# Patient Record
Sex: FEMALE | Race: WHITE | Employment: STUDENT | URBAN - METROPOLITAN AREA
[De-identification: names, ages, dates, MRNs, and addresses within clinical notes are randomized per-mention and may not be internally consistent; named-entity substitution may affect disease eponyms.]

---

## 2024-06-09 DIAGNOSIS — F39 MOOD DISORDER (HCC): ICD-10-CM

## 2024-06-10 RX ORDER — ARIPIPRAZOLE 10 MG/1
10 TABLET ORAL DAILY
Qty: 30 TABLET | Refills: 4 | OUTPATIENT
Start: 2024-06-10 | End: 2024-07-10

## 2024-08-12 ENCOUNTER — OFFICE VISIT (OUTPATIENT)
Dept: ORTHOPEDIC SURGERY | Age: 24
End: 2024-08-12

## 2024-08-12 VITALS — HEIGHT: 61 IN | BODY MASS INDEX: 23.6 KG/M2 | WEIGHT: 125 LBS

## 2024-08-12 DIAGNOSIS — M25.561 RIGHT KNEE PAIN, UNSPECIFIED CHRONICITY: Primary | ICD-10-CM

## 2024-08-12 RX ORDER — DICLOFENAC SODIUM 75 MG/1
75 TABLET, DELAYED RELEASE ORAL 2 TIMES DAILY
Qty: 60 TABLET | Refills: 0 | Status: SHIPPED | OUTPATIENT
Start: 2024-08-12

## 2024-08-12 NOTE — PROGRESS NOTES
Name: Eri Valero  YOB: 2000  Gender: female  MRN: [id number]    CC: Knee Pain (R)     HPI: Eri Valero is a 24 y.o. female who presents with Knee Pain (R)  She describes an injury that occurred approximately 4 weeks ago while working in a .  She stepped on a Lego block and felt a pivot and pop in her knee.  Since that time she has been using an knee brace for ambulation and using over-the-counter Tylenol and ibuprofen for pain.  She states ibuprofen and Tylenol did not help with her pain.  She has also been utilizing crutches but did not bring them in with her today.  She has not been able to go to work due to this knee pain and she is not able to go back until she can lift 50 pounds without pain.  She is not able to straighten his knee out at this time. She presents today with x-rays for further evaluation.     ROS/Meds/PSH/PMH/FH/SH: I personally reviewed the patients standard intake form.  Below are the pertinents    Tobacco:  reports that she has been smoking e-cigarettes. She uses smokeless tobacco.  Diabetes: none  Other: none    Physical Examination:  General: no acute distress  Lungs: breathing easily  CV: regular rhythm by pulse  Right Knee:     No significant joint effusion noted today.  Negative apprehension test.  I am not able to straighten out her leg today due to the pain and guarding.  Equivocal Lachman's. Equivocal anterior and posterior drawer.  Stable to varus stress I have a hard time evaluating very stressed today due to pain and guarding.  Calf is soft.  Sensation intact distally.  Dorsi and plantarflexion looks intact.  Dorsalis pedis pulse 2+.  Palpate along the hamstring tendons.  Tender to palpate along the medial joint line.    Imaging:     X-rays reviewed on PACS today but they were pushed from an outside source.  AP, oblique, lateral views of right knee reviewed today.  No acute findings noted.  Joint space well-maintained.  Normal right knee

## 2024-08-14 ENCOUNTER — TELEMEDICINE (OUTPATIENT)
Dept: BEHAVIORAL/MENTAL HEALTH CLINIC | Age: 24
End: 2024-08-14
Payer: COMMERCIAL

## 2024-08-14 DIAGNOSIS — F39 MOOD DISORDER (HCC): Primary | ICD-10-CM

## 2024-08-14 DIAGNOSIS — F51.04 PSYCHOPHYSIOLOGICAL INSOMNIA: ICD-10-CM

## 2024-08-14 PROCEDURE — 99215 OFFICE O/P EST HI 40 MIN: CPT | Performed by: NURSE PRACTITIONER

## 2024-08-14 RX ORDER — ARIPIPRAZOLE 15 MG/1
15 TABLET ORAL EVERY MORNING
Qty: 30 TABLET | Refills: 4 | Status: SHIPPED | OUTPATIENT
Start: 2024-08-14 | End: 2025-01-11

## 2024-08-14 RX ORDER — TRAZODONE HYDROCHLORIDE 100 MG/1
100 TABLET ORAL NIGHTLY PRN
Qty: 30 TABLET | Refills: 3 | Status: SHIPPED | OUTPATIENT
Start: 2024-08-14 | End: 2024-12-12

## 2024-08-14 ASSESSMENT — PATIENT HEALTH QUESTIONNAIRE - PHQ9
3. TROUBLE FALLING OR STAYING ASLEEP: NOT AT ALL
SUM OF ALL RESPONSES TO PHQ QUESTIONS 1-9: 0
SUM OF ALL RESPONSES TO PHQ9 QUESTIONS 1 & 2: 0
9. THOUGHTS THAT YOU WOULD BE BETTER OFF DEAD, OR OF HURTING YOURSELF: NOT AT ALL
SUM OF ALL RESPONSES TO PHQ QUESTIONS 1-9: 0
7. TROUBLE CONCENTRATING ON THINGS, SUCH AS READING THE NEWSPAPER OR WATCHING TELEVISION: NOT AT ALL
4. FEELING TIRED OR HAVING LITTLE ENERGY: NOT AT ALL
2. FEELING DOWN, DEPRESSED OR HOPELESS: NOT AT ALL
5. POOR APPETITE OR OVEREATING: NOT AT ALL
SUM OF ALL RESPONSES TO PHQ QUESTIONS 1-9: 0
1. LITTLE INTEREST OR PLEASURE IN DOING THINGS: NOT AT ALL
SUM OF ALL RESPONSES TO PHQ QUESTIONS 1-9: 0
6. FEELING BAD ABOUT YOURSELF - OR THAT YOU ARE A FAILURE OR HAVE LET YOURSELF OR YOUR FAMILY DOWN: NOT AT ALL
10. IF YOU CHECKED OFF ANY PROBLEMS, HOW DIFFICULT HAVE THESE PROBLEMS MADE IT FOR YOU TO DO YOUR WORK, TAKE CARE OF THINGS AT HOME, OR GET ALONG WITH OTHER PEOPLE: NOT DIFFICULT AT ALL

## 2024-08-14 NOTE — PROGRESS NOTES
OUTPATIENT PSYCHIATRIC RETURN VISIT PROGRESS NOTE  Eri Valero, was evaluated through a synchronous (real-time) audio-video encounter. The patient (or guardian if applicable) is aware that this is a billable service, which includes applicable co-pays. This Virtual Visit was conducted with patient's (and/or legal guardian's) consent. Patient identification was verified, and a caregiver was present when appropriate.   The patient was located at Home: [address] [facility] [address]  Provider was located at Facility (Appt Dept): [address] [facility]  [address]  [place] [address]  Confirm you are appropriately licensed, registered, or certified to deliver care in the state where the patient is located as indicated above. If you are not or unsure, please re-schedule the visit: Yes, I confirm.        45  minutes total time spent for this encounter: face to face with the patient with more than 50% of the total time spent on education, counseling, & coordination of care of the patient regarding ongoing psychiatric illness.    --Suzanne Bermudez, UVALDO - CNP on 8/14/2024 at 1:26 PM    An electronic signature was used to authenticate this note.   Date of Service: 8/14/2024     Identification    Eri Valero is a 24 y.o.  with a past psychiatric history of mood d/o, insomnia  who presents today for a psychiatric follow up appointment.      CC:  Routine medication management follow up.    Chief Complaint   Patient presents with    Follow-up     2 month f/u, has had fluctuating emotions for about a month and being irritable, wants to discuss med modification        Subjective / Interval History:    Pt visit via My Chart  today and reports that Abilify was effective initially but effectiveness has waned the past 3 weeks and she has been more irritable. Agrees to increase Abilify to 15 mg Q AM. Trazodone 100 mg effective for insomnia. . Pt has been medication compliant and denies any side-effects. Pt denies

## 2024-08-28 ENCOUNTER — TELEPHONE (OUTPATIENT)
Dept: ORTHOPEDIC SURGERY | Age: 24
End: 2024-08-28

## 2024-08-28 DIAGNOSIS — M25.561 RIGHT KNEE PAIN, UNSPECIFIED CHRONICITY: Primary | ICD-10-CM

## 2024-08-28 NOTE — TELEPHONE ENCOUNTER
She needs the order for the MRI of right knee faxed to her at # [phone] which is the number to [facility].

## 2024-09-12 ENCOUNTER — OFFICE VISIT (OUTPATIENT)
Dept: ORTHOPEDIC SURGERY | Age: 24
End: 2024-09-12
Payer: COMMERCIAL

## 2024-09-12 DIAGNOSIS — M25.561 ACUTE PAIN OF RIGHT KNEE: Primary | ICD-10-CM

## 2024-09-12 PROCEDURE — 99214 OFFICE O/P EST MOD 30 MIN: CPT | Performed by: ORTHOPAEDIC SURGERY

## 2024-09-12 PROCEDURE — M5036 MISC REPAREL KNEE: HCPCS | Performed by: ORTHOPAEDIC SURGERY

## 2024-10-04 ENCOUNTER — HOSPITAL ENCOUNTER (OUTPATIENT)
Dept: PHYSICAL THERAPY | Age: 24
Setting detail: RECURRING SERIES
Discharge: HOME | End: 2024-10-07
Attending: ORTHOPAEDIC SURGERY
Payer: COMMERCIAL

## 2024-10-04 DIAGNOSIS — M62.81 MUSCLE WEAKNESS (GENERALIZED): ICD-10-CM

## 2024-10-04 DIAGNOSIS — M25.561 PAIN OF RIGHT PATELLOFEMORAL JOINT: Primary | ICD-10-CM

## 2024-10-04 PROCEDURE — 97110 THERAPEUTIC EXERCISES: CPT

## 2024-10-04 PROCEDURE — 97161 PT EVAL LOW COMPLEX 20 MIN: CPT

## 2024-10-04 ASSESSMENT — PAIN SCALES - GENERAL: PAINLEVEL_OUTOF10: 4

## 2024-10-04 NOTE — PROGRESS NOTES
Eri Valero  : 2000  Primary: [employer] (Worker's Comp)  Secondary:  [facility] @ [place] [address] [place] [address]  Phone: [phone]  Fax: [phone] Plan Frequency: 1x/wk, 4 weeks  Plan of Care/Certification Expiration Date: 24        Plan of Care/Certification Expiration Date:  Plan of Care/Certification Expiration Date: 24    Frequency/Duration: Plan Frequency: 1x/wk, 4 weeks      Time In/Out:   Time In: 1020  Time Out: 1100      PT Visit Info:         Visit Count:  1    OUTPATIENT PHYSICAL THERAPY:   Treatment Note 10/4/2024       Episode  (knee pain)               Treatment Diagnosis:    Pain of right patellofemoral joint  Muscle weakness (generalized)  Medical/Referring Diagnosis:    Acute pain of right knee    Referring Physician:  Segundo Martin MD MD Orders:  PT Eval and Treat   Return MD Appt:  tbd   Date of Onset:  Onset Date: 24     Allergies:   Clindamycin  Restrictions/Precautions:   None      Interventions Planned (Treatment may consist of any combination of the following):     See Assessment Note    Subjective Comments:   See initial evaluation  Initial Pain Level::     4/10  Post Session Pain Level:       2/10  Medications Last Reviewed:  10/4/2024  Updated Objective Findings:  See initial evaluation  Treatment     THERAPEUTIC EXERCISE: (20 minutes):    Exercises per grid below to improve mobility, strength, balance, and coordination.   Progressed resistance and repetitions as indicated.     Date:  10/4/2024 Date:   Date:     Activity/Exercise Parameters Parameters Parameters   Edu Diagnosis, prognosis, POC, HEP, anatomy/physiology of condition       Seated knee extension stretching Discussion and HEP     Figure 4 bridges 2x10/side     SLR Max set 30 reps    HEP 3x25 reps     squats 3x10     Sidesteps and monster walks BTB, 1x60 feet               THERAPEUTIC ACTIVITY: ( 0 minutes):    Therapeutic

## 2024-10-04 NOTE — THERAPY EVALUATION
Tolerance/Endurance*   Therapy Prognosis:   Excellent     Initial Assessment Complexity:   Low Complexity       PLAN   Effective Dates: 10/4/2024 TO Plan of Care/Certification Expiration Date: 11/03/24     Frequency/Duration: Plan Frequency: 1x/wk, 4 weeks      Interventions Planned (Treatment may consist of any combination of the following):    Home Exercise Program (HEP), Pain Management, Modalities: and   Heat/Cold, Return to Work-Related Activiity, Therapeutic Activites, and Therapeutic Exercise/Strengthening   Goals: (Goals have been discussed and agreed upon with patient.)    Discharge Goals: Time Frame: 4 weeks  Pt will lift at least 50 lbs from floor to waist to demonstrate improved ability to perform job duties.  Pt will back squat at least 45 lbs to demonstrate improved functional strength and readiness to begin independent strengthening program.  Pt will carry at least 30 lbs in each hand over at least 150 feet to demonstrate improved ability to carry heavier household items.          Medical Necessity:   Skilled intervention continues to be required due to current impairments.    Reason For Services/Other Comments:  Patient continues to require skilled intervention due to patient continues to present with impairments assessed at initial evaluation and requiring skilled physical therapy to meet goals for PT.        Regarding Eri Valero's therapy, I certify that the treatment plan above will be carried out by a therapist or under their direction.  Thank you for this referral,  Marielle Cheek, PT     Referring Physician Signature: Segundo Martin MD         Charge Capture  Events  Appt Desk  Attendance Report

## 2024-10-09 ENCOUNTER — HOSPITAL ENCOUNTER (OUTPATIENT)
Dept: PHYSICAL THERAPY | Age: 24
Setting detail: RECURRING SERIES
Discharge: HOME | End: 2024-10-12
Attending: ORTHOPAEDIC SURGERY
Payer: COMMERCIAL

## 2024-10-09 PROCEDURE — 97110 THERAPEUTIC EXERCISES: CPT

## 2024-10-09 NOTE — PROGRESS NOTES
minutes  Time In: 1515  Time Out: 1555    Marielle Cheek PT         Charge Capture  Events  [employer] Portal  Appt Desk  Attendance Report     Future Appointments   Date Time Provider Department Center   10/16/2024  3:00 PM Marielle Cheek PT [facility]   10/21/2024  9:45 AM Marielle Cheek PT [facility]   10/24/2024 10:30 AM Segundo Martin MD [facility]   10/31/2024 10:30 AM Marielle Cheek PT [facility]

## 2024-10-16 ENCOUNTER — HOSPITAL ENCOUNTER (OUTPATIENT)
Dept: PHYSICAL THERAPY | Age: 24
Setting detail: RECURRING SERIES
Discharge: HOME | End: 2024-10-19
Attending: ORTHOPAEDIC SURGERY
Payer: COMMERCIAL

## 2024-10-16 PROCEDURE — 97110 THERAPEUTIC EXERCISES: CPT

## 2024-10-16 NOTE — PROGRESS NOTES
Eri Valero  : 2000  Primary: [employer] (Worker's Comp)  Secondary:  [facility] @ [place] [address] [place] [address]  Phone: [phone]  Fax: [phone] Plan Frequency: 1x/wk, 4 weeks  Plan of Care/Certification Expiration Date: 24        Plan of Care/Certification Expiration Date:  Plan of Care/Certification Expiration Date: 24    Frequency/Duration: Plan Frequency: 1x/wk, 4 weeks      Time In/Out:   Time In: 1500  Time Out: 1539      PT Visit Info:         Visit Count:  3    OUTPATIENT PHYSICAL THERAPY:   Treatment Note 10/16/2024       Episode  (knee pain)               Treatment Diagnosis:    Pain of right patellofemoral joint  Muscle weakness (generalized)  Medical/Referring Diagnosis:    Acute pain of right knee    Referring Physician:  Segundo Martin MD MD Orders:  PT Eval and Treat   Return MD Appt:  tbd   Date of Onset:  Onset Date: 24     Allergies:   Clindamycin  Restrictions/Precautions:   None      Interventions Planned (Treatment may consist of any combination of the following):     See Assessment Note    Subjective Comments:   Doing well. Wasn't really sore after last session.   Initial Pain Level::     0 /10  Post Session Pain Level:      0  /10  Medications Last Reviewed:  10/16/2024  Updated Objective Findings:  See initial evaluation  Treatment     THERAPEUTIC EXERCISE: (39 minutes):    Exercises per grid below to improve mobility, strength, balance, and coordination.   Progressed resistance and repetitions as indicated.     Date:  10/4/2024 Date:  10/9/24 Date:  10/16/24    Activity/Exercise Parameters Parameters Parameters    Edu Diagnosis, prognosis, POC, HEP, anatomy/physiology of condition        Seated knee extension stretching Discussion and HEP      Figure 4 bridges 2x10/side      SLR Max set 30 reps    HEP 3x25 reps      UE assisted deep squat  10 sec holds x5      squats 3x10 3x6x15 lbs,

## 2024-10-21 ENCOUNTER — HOSPITAL ENCOUNTER (OUTPATIENT)
Dept: PHYSICAL THERAPY | Age: 24
Setting detail: RECURRING SERIES
Discharge: HOME | End: 2024-10-24
Attending: ORTHOPAEDIC SURGERY
Payer: COMMERCIAL

## 2024-10-21 PROCEDURE — 97110 THERAPEUTIC EXERCISES: CPT

## 2024-10-21 NOTE — PROGRESS NOTES
Eri Valero  : 2000  Primary: [employer] (Worker's Comp)  Secondary:  [facility] @ [place] [address] [place] [address]  Phone: [phone]  Fax: [phone] Plan Frequency: 1x/wk, 4 weeks  Plan of Care/Certification Expiration Date: 24        Plan of Care/Certification Expiration Date:  Plan of Care/Certification Expiration Date: 24    Frequency/Duration: Plan Frequency: 1x/wk, 4 weeks      Time In/Out:   Time In: 0945  Time Out: 1026      PT Visit Info:         Visit Count:  4    OUTPATIENT PHYSICAL THERAPY:   Treatment Note 10/21/2024       Episode  (knee pain)               Treatment Diagnosis:    Pain of right patellofemoral joint  Muscle weakness (generalized)  Medical/Referring Diagnosis:    Acute pain of right knee    Referring Physician:  Segundo Martin MD MD Orders:  PT Eval and Treat   Return MD Appt:  tbd   Date of Onset:  Onset Date: 24     Allergies:   Clindamycin  Restrictions/Precautions:   None      Interventions Planned (Treatment may consist of any combination of the following):     See Assessment Note    Subjective Comments:   Knee's feeling good still. Can really notice a difference. Feeling ready to go back to work and she can't think of anything she's nervous about. Feeling pretty confident overall.   Initial Pain Level::     0 /10  Post Session Pain Level:      0  /10  Medications Last Reviewed:  10/21/2024  Updated Objective Findings:  See initial evaluation  Treatment     THERAPEUTIC EXERCISE: (41 minutes):    Exercises per grid below to improve mobility, strength, balance, and coordination.   Progressed resistance and repetitions as indicated.     Date:  10/4/2024 Date:  10/9/24 Date:  10/16/24 Date  10/21/24   Activity/Exercise Parameters Parameters Parameters    Edu Diagnosis, prognosis, POC, HEP, anatomy/physiology of condition        Seated knee extension stretching Discussion and HEP      Figure

## 2024-10-21 NOTE — PROGRESS NOTES
Eri  is a 24 y.o.   who presents today for pain in the right breast that started approximately 1 week ago.  Denies redness/dimpling/streaking. She denies fever/chills/flu-like symptoms. Denies nipple discharge/inversion.  States does not consume caffeine typically. She denies recent trauma.       Patient's last menstrual period was 2024 (exact date).   Last MGM Never    Personal Breast History:  none  Family Breast History:  Breast cancer in mother (dx age 50)  Social History     Tobacco Use    Smoking status: Every Day     Types: E-Cigarettes    Smokeless tobacco: Current    Tobacco comments:     quit smoking 2022   Substance Use Topics    Alcohol use: No           HISTORY:    Current Outpatient Medications   Medication Sig Dispense Refill    ARIPiprazole (ABILIFY) 15 MG tablet Take 1 tablet by mouth every morning 30 tablet 4    traZODone (DESYREL) 100 MG tablet Take 1 tablet by mouth nightly as needed for Sleep 30 tablet 3    diclofenac (VOLTAREN) 75 MG EC tablet Take 1 tablet by mouth 2 times daily 60 tablet 0    norgestimate-ethinyl estradiol (ORTHO-CYCLEN) 0.25-35 MG-MCG per tablet Take 1 tablet by mouth daily (Patient not taking: Reported on 10/22/2024) 3 packet 3     No current facility-administered medications for this visit.         ROS:  Gyn ROS: see above    PHYSICAL EXAM:  Blood pressure 98/66, height 1.549 m (5' 1\"), weight 59.2 kg (130 lb 8 oz), last menstrual period 2024.  The patient appears well, alert, oriented x 3, in no distress. Normal thought process and judgement.  Right Breast:  normal without suspicious masses, skin or nipple changes or axillary nodes, symmetric fibrous changes in both upper outer quadrants, self-exam is taught and encouraged.  Left Breast:  normal without suspicious masses, skin or nipple changes or axillary nodes, symmetric fibrous changes in both upper outer quadrants, self-exam is taught and encouraged, axillary adenopathy neg    ASSESSMENT &

## 2024-10-22 ENCOUNTER — OFFICE VISIT (OUTPATIENT)
Dept: OBGYN CLINIC | Age: 24
End: 2024-10-22
Payer: MEDICAID

## 2024-10-22 VITALS
SYSTOLIC BLOOD PRESSURE: 98 MMHG | DIASTOLIC BLOOD PRESSURE: 66 MMHG | WEIGHT: 130.5 LBS | HEIGHT: 61 IN | BODY MASS INDEX: 24.64 KG/M2

## 2024-10-22 DIAGNOSIS — N64.4 BREAST PAIN: Primary | ICD-10-CM

## 2024-10-22 DIAGNOSIS — N92.6 LATE MENSES: ICD-10-CM

## 2024-10-22 DIAGNOSIS — Z80.3 FAMILY HISTORY OF BREAST CANCER IN MOTHER: ICD-10-CM

## 2024-10-22 LAB
HCG, PREGNANCY, URINE, POC: NEGATIVE
VALID INTERNAL CONTROL, POC: YES

## 2024-10-22 PROCEDURE — 99214 OFFICE O/P EST MOD 30 MIN: CPT | Performed by: NURSE PRACTITIONER

## 2024-10-22 PROCEDURE — 81025 URINE PREGNANCY TEST: CPT | Performed by: NURSE PRACTITIONER

## 2024-10-24 ENCOUNTER — OFFICE VISIT (OUTPATIENT)
Dept: ORTHOPEDIC SURGERY | Age: 24
End: 2024-10-24

## 2024-10-24 DIAGNOSIS — M25.561 ACUTE PAIN OF RIGHT KNEE: Primary | ICD-10-CM

## 2024-10-24 NOTE — PROGRESS NOTES
Name: Eri Valero  YOB: 2000  Gender: female  MRN: [id number]      CC: Knee Pain (R)       HPI: Eri Valero is a 24 y.o. female who returns for workers comp follow up on the right knee.  She reports improvement in her symptoms with physical therapy.  She has 1 visit remaining next week.  Her pain has greatly improved since last visit.        Physical Examination:  General: no acute distress  Lungs: breathing easily  CV: regular rhythm by pulse  Right Knee: Full passive and active range of motion no focal tenderness palpation posteriorly or medial or lateral joint line ligamentously stable x 4 negative meniscal provocative testing.        Assessment:     ICD-10-CM    1. Acute pain of right knee  M25.561           Plan:   She is progressing very well.  I think she should finish her last physical therapy visit and continue home exercise program.  I do think it is okay for her to return to work regular duty.  We will place her at [facility] without permanent work restrictions or permanent physical impairment.  She can follow-up as needed            Segundo Martin MD, FAAOS  Orthopaedics and Sports Medicine

## 2024-10-31 ENCOUNTER — APPOINTMENT (OUTPATIENT)
Dept: PHYSICAL THERAPY | Age: 24
End: 2024-10-31
Attending: ORTHOPAEDIC SURGERY
Payer: COMMERCIAL

## 2024-11-11 ENCOUNTER — HOSPITAL ENCOUNTER (OUTPATIENT)
Dept: PHYSICAL THERAPY | Age: 24
Setting detail: RECURRING SERIES
Discharge: HOME | End: 2024-11-14
Payer: COMMERCIAL

## 2024-11-11 PROCEDURE — 97110 THERAPEUTIC EXERCISES: CPT

## 2024-11-11 NOTE — PROGRESS NOTES
Eri Valero  : 2000  Primary: [employer] (Worker's Comp)  Secondary: [employer] [facility] @ [place] [address] [place] [address]  Phone: [phone]  Fax: [phone] Plan Frequency: 1x/wk, 4 weeks  Plan of Care/Certification Expiration Date: 24        Plan of Care/Certification Expiration Date:  Plan of Care/Certification Expiration Date: 24    Frequency/Duration: Plan Frequency: 1x/wk, 4 weeks      Time In/Out:   Time In: 0845  Time Out: 09      PT Visit Info:         Visit Count:  5    OUTPATIENT PHYSICAL THERAPY:   Treatment Note 2024       Episode  (knee pain)               Treatment Diagnosis:    Pain of right patellofemoral joint  Muscle weakness (generalized)  Medical/Referring Diagnosis:    Acute pain of right knee    Referring Physician:  Segundo Martin MD MD Orders:  PT Eval and Treat   Return MD Appt:  tbd   Date of Onset:  Onset Date: 24     Allergies:   Clindamycin  Restrictions/Precautions:   None      Interventions Planned (Treatment may consist of any combination of the following):     See Assessment Note    Subjective Comments:   See Discharge Summary  Initial Pain Level::     0 /10  Post Session Pain Level:      0  /10  Medications Last Reviewed:  2024  Updated Objective Findings:  See Discharge Summary  Treatment     THERAPEUTIC EXERCISE: (35 minutes):    Exercises per grid below to improve mobility, strength, balance, and coordination.   Progressed resistance and repetitions as indicated.     Date:  10/4/2024 Date:  10/9/24 Date:  10/16/24 Date  10/21/24 Date  24   Activity/Exercise Parameters Parameters Parameters     Edu Diagnosis, prognosis, POC, HEP, anatomy/physiology of condition         Seated knee extension stretching Discussion and HEP       Figure 4 bridges 2x10/side       SLR Max set 30 reps    HEP 3x25 reps       UE assisted deep squat  10 sec holds x5       squats

## 2024-11-11 NOTE — THERAPY DISCHARGE
slightly out. No significant knee valgus. Hip crease below level of knee with appropriate trunk angle. [id number] lbs [place] squat   Stair Negotiation        Outcome Measure:   Tool Used: Lower Extremity Functional Scale (LEFS)  Score:  Initial: 49/80 Most Recent: 72/80 (Date: 11/11/24 )   Interpretation of Score: 20 questions each scored on a 5 point scale with 0 representing \"extreme difficulty or unable to perform\" and 4 representing \"no difficulty\".  The lower the score, the greater the functional disability. 80/80 represents no disability.  Minimal detectable change is 9 points.    ASSESSMENT   Discharge Summary:  Eri Valero demonstrates significantly improved knee ROM, activity tolerance, tolerance to work duties and responsibilities at home. Pt demonstrates independence with HEP for continued LE strengthening. Pt is appropriate to discharge from PT at this time.     Therapy Problem List: (Impacting functional limitations):    Increased Pain, Decreased Strength, Decreased ROM, Decreased Functional Mobility, Decreased [place] with Home Exercise Program, Decreased Body Mechanics, and Decreased Activity Tolerance/Endurance*   Therapy Prognosis:   Excellent     Initial Assessment Complexity:   Low Complexity       PLAN   Effective Dates: 10/4/2024 TO Plan of Care/Certification Expiration Date: 11/03/24     Frequency/Duration: Plan Frequency: 1x/wk, 4 weeks      Interventions Planned (Treatment may consist of any combination of the following):    Home Exercise Program (HEP), Pain Management, Modalities: and   Heat/Cold, Return to Work-Related Activiity, Therapeutic Activites, and Therapeutic Exercise/Strengthening   Goals: (Goals have been discussed and agreed upon with patient.)    Discharge Goals: Time Frame: 4 weeks  Pt will lift at least 50 lbs from floor to waist to demonstrate improved ability to perform job duties. (Progressing, pt met functional portion before objective portion of goal)  Pt will

## 2024-11-21 ENCOUNTER — HOSPITAL ENCOUNTER (OUTPATIENT)
Dept: MAMMOGRAPHY | Age: 24
Discharge: HOME | End: 2024-11-21
Payer: COMMERCIAL

## 2024-11-21 DIAGNOSIS — N64.4 BREAST PAIN: ICD-10-CM

## 2024-11-21 DIAGNOSIS — Z80.3 FAMILY HISTORY OF BREAST CANCER IN MOTHER: ICD-10-CM

## 2024-11-21 PROCEDURE — 76642 ULTRASOUND BREAST LIMITED: CPT

## 2025-01-14 NOTE — PROGRESS NOTES
The patient is a 24 y.o.  who is seen for gyn ultrasound performed secondary to passing clots and cramping with most recent cycle. States was not saturating a pad every hour. Notes cycle was a little heavier than average with more cramping than usual, just wanted to eval to make sure nothing more serious going on. Notes cycles overall monthly lasting 5-6. Denies significant menorrhagia/dysmenorrhea typically. Has been TTC for approx 6mo now, so is on no form of hormonal contraception currently. Notes is doing her best to time intercourse, but as her  works third shift, can be difficult.     UPT was negative on 1/3/25.  Actively TTC.       Ultrasound findings from today:  CX WNL   UT ANTEVERTED   ENDO= 10.7MM, CONTENTS IN MOTIONWITH INCREASED PERIPHERAL FLOW BU NOT DEFINITE INTRACAVITARY MASS SEEN   ROV- SIMPLE APPEARING FOLLICULAR CYST   LOV- WNL   NO FREE FLUID   Uterine volume= 98.998 cm     HISTORY:    Patient's last menstrual period was 2024 (exact date).  Sexual History:  has sex with males  Contraception:  none  No current outpatient medications on file prior to visit.     No current facility-administered medications on file prior to visit.       ROS:  Feeling well. No dyspnea or chest pain on exertion.  No abdominal pain, change in bowel habits, black or bloody stools.  No urinary tract symptoms. GYN ROS: see above.    PHYSICAL EXAM:  Blood pressure 102/60, height 1.549 m (5' 1\"), weight 60.7 kg (133 lb 14.4 oz), last menstrual period 2024.    The patient appears well, alert, oriented x 3, in no distress.    ASSESSMENT:  Encounter Diagnoses   Name Primary?    DUB (dysfunctional uterine bleeding) Yes    Menorrhagia with regular cycle     Abdominal cramping     Screening for thyroid disorder     Encounter for vitamin deficiency screening     Screening for diabetes mellitus        PLAN:  All questions answered  US reviewed with pt and WNL  [place] window and timed intercourse

## 2025-01-15 ENCOUNTER — OFFICE VISIT (OUTPATIENT)
Dept: OBGYN CLINIC | Age: 25
End: 2025-01-15
Payer: COMMERCIAL

## 2025-01-15 ENCOUNTER — PROCEDURE VISIT (OUTPATIENT)
Dept: OBGYN CLINIC | Age: 25
End: 2025-01-15
Payer: COMMERCIAL

## 2025-01-15 VITALS
WEIGHT: 133.9 LBS | DIASTOLIC BLOOD PRESSURE: 60 MMHG | HEIGHT: 61 IN | SYSTOLIC BLOOD PRESSURE: 102 MMHG | BODY MASS INDEX: 25.28 KG/M2

## 2025-01-15 DIAGNOSIS — R10.9 ABDOMINAL CRAMPING: ICD-10-CM

## 2025-01-15 DIAGNOSIS — Z13.21 ENCOUNTER FOR VITAMIN DEFICIENCY SCREENING: ICD-10-CM

## 2025-01-15 DIAGNOSIS — N92.0 MENORRHAGIA WITH REGULAR CYCLE: ICD-10-CM

## 2025-01-15 DIAGNOSIS — Z13.1 SCREENING FOR DIABETES MELLITUS: ICD-10-CM

## 2025-01-15 DIAGNOSIS — N93.8 DUB (DYSFUNCTIONAL UTERINE BLEEDING): Primary | ICD-10-CM

## 2025-01-15 DIAGNOSIS — N93.9 ABNORMAL UTERINE BLEEDING (AUB): Primary | ICD-10-CM

## 2025-01-15 DIAGNOSIS — N93.8 DUB (DYSFUNCTIONAL UTERINE BLEEDING): ICD-10-CM

## 2025-01-15 DIAGNOSIS — Z13.29 SCREENING FOR THYROID DISORDER: ICD-10-CM

## 2025-01-15 LAB
25(OH)D3 SERPL-MCNC: 22.3 NG/ML (ref 30–100)
EST. AVERAGE GLUCOSE BLD GHB EST-MCNC: 105 MG/DL
HBA1C MFR BLD: 5.3 % (ref 0–5.6)
HCG SERPL-ACNC: <1 MIU/ML
T4 FREE SERPL-MCNC: 1.1 NG/DL (ref 0.9–1.7)
TSH, 3RD GENERATION: 0.69 UIU/ML (ref 0.27–4.2)

## 2025-01-15 PROCEDURE — 99214 OFFICE O/P EST MOD 30 MIN: CPT | Performed by: NURSE PRACTITIONER

## 2025-01-15 PROCEDURE — 4004F PT TOBACCO SCREEN RCVD TLK: CPT | Performed by: NURSE PRACTITIONER

## 2025-01-15 PROCEDURE — 76830 TRANSVAGINAL US NON-OB: CPT | Performed by: OBSTETRICS & GYNECOLOGY

## 2025-01-15 PROCEDURE — G8427 DOCREV CUR MEDS BY ELIG CLIN: HCPCS | Performed by: NURSE PRACTITIONER

## 2025-01-15 PROCEDURE — G8419 CALC BMI OUT NRM PARAM NOF/U: HCPCS | Performed by: NURSE PRACTITIONER

## 2025-01-16 RX ORDER — ERGOCALCIFEROL 1.25 MG/1
50000 CAPSULE, LIQUID FILLED ORAL WEEKLY
Qty: 8 CAPSULE | Refills: 0 | Status: SHIPPED | OUTPATIENT
Start: 2025-01-16

## 2025-03-21 NOTE — PROGRESS NOTES
ffThe patient is a 24 y.o.  who is seen to discuss her new pregnancy. Pt denies any current unilateral pain, cramping, urinary symptoms, or vaginal bleeding. She is currently taking an over the counter PNV with DHA and folic acid.     LMP: 25  GABRIELA based off of LMP: 25  GA today: 7w4d    Ultrasound Findings Today:    HISTORY:      No LMP recorded.  Sexual History:  {Sexual Hx:5163}  Contraception:  {Contraception Methods:5051}  Current Outpatient Medications on File Prior to Visit   Medication Sig Dispense Refill    vitamin D (ERGOCALCIFEROL) 1.25 MG (20707 UT) CAPS capsule Take 1 capsule by mouth once a week 8 capsule 0     No current facility-administered medications on file prior to visit.       ROS:  Feeling well. No dyspnea or chest pain on exertion.  No abdominal pain, change in bowel habits, black or bloody stools.  No urinary tract symptoms. GYN ROS: {gyn ros:100509}.    PHYSICAL EXAM:  There were no vitals taken for this visit.    The patient appears well, alert, oriented x 3, in no distress.  Lungs are clear. Heart RRR, no murmurs. Abdomen soft without tenderness, guarding, mass or organomegaly.  Pelvic: {pelvic exam:986836::\"normal external genitalia, vulva, vagina, cervix, uterus and adnexa\"}.    ASSESSMENT:  Encounter Diagnosis   Name Primary?    Missed menses Yes       PLAN:  All questions answered  Diagnosis explained in detail, including differential  US images and findings reviewed with pt.   Reassured of IUP with + FHT=  YS seen  OB booklet given. Reviewed OTC meds and genetic screening options at length.   Pain/bleeding precautions   Rtc 2 wks nob talk  Continue pnv        {Gyn plan:17564}  No orders of the defined types were placed in this encounter.          Supervising physician is  ****.  Greater than 50% of the *** minute visit were spent in counseling to the above topics.

## 2025-03-24 ENCOUNTER — PROCEDURE VISIT (OUTPATIENT)
Dept: OBGYN CLINIC | Age: 25
End: 2025-03-24
Payer: COMMERCIAL

## 2025-03-24 ENCOUNTER — OFFICE VISIT (OUTPATIENT)
Dept: OBGYN CLINIC | Age: 25
End: 2025-03-24
Payer: COMMERCIAL

## 2025-03-24 VITALS
BODY MASS INDEX: 25.32 KG/M2 | SYSTOLIC BLOOD PRESSURE: 98 MMHG | WEIGHT: 134.1 LBS | DIASTOLIC BLOOD PRESSURE: 60 MMHG | HEIGHT: 61 IN

## 2025-03-24 DIAGNOSIS — N92.6 MISSED MENSES: Primary | ICD-10-CM

## 2025-03-24 LAB
HCG, PREGNANCY, URINE, POC: POSITIVE
VALID INTERNAL CONTROL, POC: YES

## 2025-03-24 PROCEDURE — G8419 CALC BMI OUT NRM PARAM NOF/U: HCPCS | Performed by: NURSE PRACTITIONER

## 2025-03-24 PROCEDURE — 99214 OFFICE O/P EST MOD 30 MIN: CPT | Performed by: NURSE PRACTITIONER

## 2025-03-24 PROCEDURE — 4004F PT TOBACCO SCREEN RCVD TLK: CPT | Performed by: NURSE PRACTITIONER

## 2025-03-24 PROCEDURE — G8427 DOCREV CUR MEDS BY ELIG CLIN: HCPCS | Performed by: NURSE PRACTITIONER

## 2025-03-24 PROCEDURE — 81025 URINE PREGNANCY TEST: CPT | Performed by: NURSE PRACTITIONER

## 2025-03-24 PROCEDURE — 76817 TRANSVAGINAL US OBSTETRIC: CPT | Performed by: STUDENT IN AN ORGANIZED HEALTH CARE EDUCATION/TRAINING PROGRAM

## 2025-03-24 RX ORDER — ONDANSETRON 4 MG/1
TABLET, ORALLY DISINTEGRATING ORAL
COMMUNITY

## 2025-03-24 NOTE — PROGRESS NOTES
The patient is a 24 y.o.  who is seen to discuss her new pregnancy. Pt denies any current unilateral pain, cramping, urinary symptoms, or vaginal bleeding. She is currently taking an over the counter PNV with DHA and folic acid.       LMP: 25  GABRIELA based off of LMP: 25  GA today: 7w4d    Ultrasound Findings Today:    Single IUP +  bpm   S=D   7w4d by LMP ----GABRIELA 25   6w6d by Ultrasound   YS appears normal   Small OTTONIEL noted anterior to the gestational sac and measures 1.3/.6/1.2cm. Asymptomatic   Bilateral ovy's wnl-----CL on the left   CX = 4.5cm   Seen by NP Sheela     HISTORY:      Patient's last menstrual period was 2025.  Sexual History:  single partner, contraception - none  Contraception:  none  Current Outpatient Medications on File Prior to Visit   Medication Sig Dispense Refill    ondansetron (ZOFRAN-ODT) 4 MG disintegrating tablet DISSOLVE 1 TABLET ON THE TONGUE EVERY 12 HOURS FOR 5 DAYS      Prenatal Multivit-Min-Fe-FA (PRE- PO) Take by mouth      vitamin D (ERGOCALCIFEROL) 1.25 MG (35708 UT) CAPS capsule Take 1 capsule by mouth once a week (Patient not taking: Reported on 3/24/2025) 8 capsule 0     No current facility-administered medications on file prior to visit.       ROS:  Feeling well. No dyspnea or chest pain on exertion.  No abdominal pain, change in bowel habits, black or bloody stools.  No urinary tract symptoms. GYN ROS: she complains of missed menses.    PHYSICAL EXAM:  Blood pressure 98/60, height 1.549 m (5' 1\"), weight 60.8 kg (134 lb 1.6 oz), last menstrual period 2025.    The patient appears well, alert, oriented x 3, in no distress.  Exam deferred    ASSESSMENT:    Comment   EOB ultrasound for missed menses   Single IUP +  bpm   S=D   7w4d by LMP ----GABRIELA 25   6w6d by Ultrasound   YS appears normal   Small OTTONIEL noted anterior to the gestational sac and measures 1.3/.6/1.2cm. Asymptomatic   Bilateral ovy's wnl-----CL on the

## 2025-03-24 NOTE — PROGRESS NOTES
The patient is a 24 y.o.  who is seen to discuss her new pregnancy. Pt denies any current unilateral pain, cramping, urinary symptoms, or vaginal bleeding. She is currently taking an over the counter PNV with DHA and folic acid.     LMP: 25  GABRIELA based off of LMP: 25  GA today:7w4d    US findings from today:      HISTORY:      No LMP recorded.  Sexual History:  {Sexual Hx:5163}  Contraception:  {Contraception Methods:5051}  Current Outpatient Medications on File Prior to Visit   Medication Sig Dispense Refill    vitamin D (ERGOCALCIFEROL) 1.25 MG (34174 UT) CAPS capsule Take 1 capsule by mouth once a week 8 capsule 0     No current facility-administered medications on file prior to visit.       ROS:  Feeling well. No dyspnea or chest pain on exertion.  No abdominal pain, change in bowel habits, black or bloody stools.  No urinary tract symptoms. GYN ROS: {gyn ros:518666}.    PHYSICAL EXAM:  There were no vitals taken for this visit.    The patient appears well, alert, oriented x 3, in no distress.  Lungs are clear. Heart RRR, no murmurs. Abdomen soft without tenderness, guarding, mass or organomegaly.  Pelvic: {pelvic exam:205737::\"normal external genitalia, vulva, vagina, cervix, uterus and adnexa\"}.    ASSESSMENT:  Encounter Diagnosis   Name Primary?    Missed menses Yes       PLAN:  All questions answered  Diagnosis explained in detail, including differential  US images and findings reviewed with pt.   Reassured of IUP with + FHT=  YS seen  OB booklet given. Reviewed OTC meds and genetic screening options at length.   Pain/bleeding precautions   Rtc 2 wks nob talk  Continue pnv        {Gyn plan:13731}  No orders of the defined types were placed in this encounter.          Supervising physician is  ****.  Greater than 50% of the *** minute visit were spent in counseling to the above topics.

## 2025-04-01 ENCOUNTER — RESULTS FOLLOW-UP (OUTPATIENT)
Dept: OBGYN CLINIC | Age: 25
End: 2025-04-01

## 2025-04-03 ENCOUNTER — TELEPHONE (OUTPATIENT)
Dept: OBGYN CLINIC | Age: 25
End: 2025-04-03

## 2025-04-03 RX ORDER — ONDANSETRON 8 MG/1
8 TABLET, FILM COATED ORAL EVERY 8 HOURS PRN
Qty: 20 TABLET | Refills: 2 | Status: SHIPPED | OUTPATIENT
Start: 2025-04-03

## 2025-04-03 NOTE — TELEPHONE ENCOUNTER
Newly established OB patient called requesting a prescription for n/v.    Zofran sent to pharmacy per standing orders.    Pt notified.    Orders Placed This Encounter    ondansetron (ZOFRAN) 8 MG tablet     Sig: Take 1 tablet by mouth every 8 hours as needed for Nausea or Vomiting     Dispense:  20 tablet     Refill:  2

## 2025-04-07 ENCOUNTER — INITIAL PRENATAL (OUTPATIENT)
Dept: OBGYN CLINIC | Age: 25
End: 2025-04-07

## 2025-04-07 VITALS — HEIGHT: 61 IN | BODY MASS INDEX: 25.02 KG/M2 | WEIGHT: 132.5 LBS

## 2025-04-07 DIAGNOSIS — F32.A ANXIETY AND DEPRESSION: ICD-10-CM

## 2025-04-07 DIAGNOSIS — Z80.3 FAMILY HISTORY OF BREAST CANCER IN FIRST DEGREE RELATIVE: ICD-10-CM

## 2025-04-07 DIAGNOSIS — Z3A.09 9 WEEKS GESTATION OF PREGNANCY: ICD-10-CM

## 2025-04-07 DIAGNOSIS — F41.9 ANXIETY AND DEPRESSION: ICD-10-CM

## 2025-04-07 DIAGNOSIS — Z72.0 CURRENT EVERY DAY NICOTINE VAPING: ICD-10-CM

## 2025-04-07 DIAGNOSIS — Z34.81 PRENATAL CARE, SUBSEQUENT PREGNANCY, FIRST TRIMESTER: Primary | ICD-10-CM

## 2025-04-07 DIAGNOSIS — Z34.81 PRENATAL CARE, SUBSEQUENT PREGNANCY, FIRST TRIMESTER: ICD-10-CM

## 2025-04-07 DIAGNOSIS — Z86.39 HISTORY OF GRAVES' DISEASE: ICD-10-CM

## 2025-04-07 PROBLEM — F51.04 PSYCHOPHYSIOLOGICAL INSOMNIA: Status: RESOLVED | Noted: 2024-06-17 | Resolved: 2025-04-07

## 2025-04-07 PROBLEM — E05.00 GRAVES' DISEASE: Status: RESOLVED | Noted: 2022-06-02 | Resolved: 2025-04-07

## 2025-04-07 PROBLEM — F39 MOOD DISORDER: Status: RESOLVED | Noted: 2022-09-29 | Resolved: 2025-04-07

## 2025-04-07 PROBLEM — E05.90 HYPERTHYROIDISM: Status: RESOLVED | Noted: 2022-05-23 | Resolved: 2025-04-07

## 2025-04-07 PROBLEM — Z34.90 PREGNANCY: Status: ACTIVE | Noted: 2025-04-07

## 2025-04-07 LAB
ABO + RH BLD: NORMAL
BASOPHILS # BLD: 0.03 K/UL (ref 0–0.2)
BASOPHILS NFR BLD: 0.4 % (ref 0–2)
BLOOD GROUP ANTIBODIES SERPL: NORMAL
DIFFERENTIAL METHOD BLD: NORMAL
EOSINOPHIL # BLD: 0.1 K/UL (ref 0–0.8)
EOSINOPHIL NFR BLD: 1.5 % (ref 0.5–7.8)
ERYTHROCYTE [DISTWIDTH] IN BLOOD BY AUTOMATED COUNT: 13.9 % (ref 11.9–14.6)
HCT VFR BLD AUTO: 41.3 % (ref 35.8–46.3)
HGB BLD-MCNC: 14.2 G/DL (ref 11.7–15.4)
HIV 1+2 AB+HIV1 P24 AG SERPL QL IA: NONREACTIVE
HIV 1/2 RESULT COMMENT: NORMAL
IMM GRANULOCYTES # BLD AUTO: 0.02 K/UL (ref 0–0.5)
IMM GRANULOCYTES NFR BLD AUTO: 0.3 % (ref 0–5)
LYMPHOCYTES # BLD: 1.78 K/UL (ref 0.5–4.6)
LYMPHOCYTES NFR BLD: 26.4 % (ref 13–44)
MCH RBC QN AUTO: 28.6 PG (ref 26.1–32.9)
MCHC RBC AUTO-ENTMCNC: 34.4 G/DL (ref 31.4–35)
MCV RBC AUTO: 83.3 FL (ref 82–102)
MONOCYTES # BLD: 0.39 K/UL (ref 0.1–1.3)
MONOCYTES NFR BLD: 5.8 % (ref 4–12)
NEUTS SEG # BLD: 4.43 K/UL (ref 1.7–8.2)
NEUTS SEG NFR BLD: 65.6 % (ref 43–78)
NRBC # BLD: 0 K/UL (ref 0–0.2)
PLATELET # BLD AUTO: 286 K/UL (ref 150–450)
PMV BLD AUTO: 10.2 FL (ref 9.4–12.3)
RBC # BLD AUTO: 4.96 M/UL (ref 4.05–5.2)
RUBV IGG SERPL IA-ACNC: 129 IU/ML
T PALLIDUM AB SER QL IA: NONREACTIVE
T4 FREE SERPL-MCNC: 1.4 NG/DL (ref 0.9–1.7)
TSH, 3RD GENERATION: 0.16 UIU/ML (ref 0.27–4.2)
WBC # BLD AUTO: 6.8 K/UL (ref 4.3–11.1)

## 2025-04-07 NOTE — PROGRESS NOTES
Eri LeeP1 presents today for nob talk.  EDC 25 based off of LMP.  Patient is currently 9 weeks 4 days pregnant.    Patient education was discussed in depth and OB book reviewed with patient.  [facility] policies reviewed with patient.    Genetic testing discussed in depth with patient.  Patient elects NIPT.    Past Medical History:  Patient Active Problem List   Diagnosis    History of Graves' disease    Current every day nicotine vaping    Pregnancy    Anxiety and depression        Patient c/o:none    Return 3 weeks for nob exam.    All questions answered patient voiced full understanding, consents signed.  Patient encouraged to call with questions or concerns.

## 2025-04-08 LAB — VZV IGG SER IA-ACNC: REACTIVE

## 2025-04-09 LAB
BACTERIA SPEC CULT: NORMAL
HBV SURFACE AG SERPL QL IA: NEGATIVE
HCV AB SERPL QL IA: NORMAL
HCV IGG SERPL QL IA: NON REACTIVE S/CO RATIO
SERVICE CMNT-IMP: NORMAL

## 2025-04-11 LAB — HGB FRACT BLD ELPH-IMP: NORMAL

## 2025-04-14 ENCOUNTER — RESULTS FOLLOW-UP (OUTPATIENT)
Dept: OBGYN CLINIC | Age: 25
End: 2025-04-14

## 2025-04-22 ENCOUNTER — HOSPITAL ENCOUNTER (EMERGENCY)
Age: 25
Discharge: HOME | End: 2025-04-22
Attending: EMERGENCY MEDICINE
Payer: COMMERCIAL

## 2025-04-22 VITALS
WEIGHT: 133.6 LBS | HEART RATE: 71 BPM | SYSTOLIC BLOOD PRESSURE: 114 MMHG | TEMPERATURE: 98 F | RESPIRATION RATE: 14 BRPM | OXYGEN SATURATION: 98 % | DIASTOLIC BLOOD PRESSURE: 79 MMHG | HEIGHT: 61 IN | BODY MASS INDEX: 25.22 KG/M2

## 2025-04-22 DIAGNOSIS — Z3A.12 12 WEEKS GESTATION OF PREGNANCY: ICD-10-CM

## 2025-04-22 DIAGNOSIS — E16.2 HYPOGLYCEMIA: ICD-10-CM

## 2025-04-22 DIAGNOSIS — R09.81 NASAL CONGESTION: ICD-10-CM

## 2025-04-22 DIAGNOSIS — J34.89 RHINORRHEA: ICD-10-CM

## 2025-04-22 DIAGNOSIS — R11.2 NAUSEA AND VOMITING, UNSPECIFIED VOMITING TYPE: Primary | ICD-10-CM

## 2025-04-22 LAB
ALBUMIN SERPL-MCNC: 4 G/DL (ref 3.5–5)
ALBUMIN/GLOB SERPL: 1.4 (ref 1–1.9)
ALP SERPL-CCNC: 76 U/L (ref 35–104)
ALT SERPL-CCNC: 12 U/L (ref 12–65)
ANION GAP SERPL CALC-SCNC: 9 MMOL/L (ref 7–16)
APPEARANCE UR: CLEAR
AST SERPL-CCNC: 20 U/L (ref 15–37)
BASOPHILS # BLD: 0.01 K/UL (ref 0–0.2)
BASOPHILS NFR BLD: 0.2 % (ref 0–2)
BILIRUB SERPL-MCNC: 0.2 MG/DL (ref 0–1.2)
BILIRUB UR QL: NEGATIVE
BUN SERPL-MCNC: 6 MG/DL (ref 6–23)
CALCIUM SERPL-MCNC: 9.4 MG/DL (ref 8.8–10.2)
CHLORIDE SERPL-SCNC: 105 MMOL/L (ref 98–107)
CHP ED QC CHECK: YES
CHP ED QC CHECK: YES
CO2 SERPL-SCNC: 23 MMOL/L (ref 20–29)
COLOR UR: YELLOW
CREAT SERPL-MCNC: 0.45 MG/DL (ref 0.8–1.3)
DIFFERENTIAL METHOD BLD: ABNORMAL
EOSINOPHIL # BLD: 0.08 K/UL (ref 0–0.8)
EOSINOPHIL NFR BLD: 1.7 % (ref 0.5–7.8)
ERYTHROCYTE [DISTWIDTH] IN BLOOD BY AUTOMATED COUNT: 13.3 % (ref 11.9–14.6)
FLUAV RNA SPEC QL NAA+PROBE: NOT DETECTED
FLUBV RNA SPEC QL NAA+PROBE: NOT DETECTED
GLOBULIN SER CALC-MCNC: 2.9 G/DL (ref 2.3–3.5)
GLUCOSE BLD STRIP.AUTO-MCNC: 68 MG/DL (ref 65–100)
GLUCOSE BLD STRIP.AUTO-MCNC: 93 MG/DL (ref 65–100)
GLUCOSE BLD-MCNC: 68 MG/DL
GLUCOSE BLD-MCNC: 68 MG/DL
GLUCOSE SERPL-MCNC: 68 MG/DL (ref 65–100)
GLUCOSE UR STRIP.AUTO-MCNC: NEGATIVE MG/DL
HCT VFR BLD AUTO: 35.4 % (ref 35.8–46.3)
HGB BLD-MCNC: 12.3 G/DL (ref 11.7–15.4)
HGB UR QL STRIP: NEGATIVE
IMM GRANULOCYTES # BLD AUTO: 0.01 K/UL (ref 0–0.5)
IMM GRANULOCYTES NFR BLD AUTO: 0.2 % (ref 0–5)
KETONES UR QL STRIP.AUTO: NEGATIVE MG/DL
LEUKOCYTE ESTERASE UR QL STRIP.AUTO: NEGATIVE
LIPASE SERPL-CCNC: 24 U/L (ref 13–60)
LYMPHOCYTES # BLD: 1.4 K/UL (ref 0.5–4.6)
LYMPHOCYTES NFR BLD: 28.9 % (ref 13–44)
MAGNESIUM SERPL-MCNC: 1.8 MG/DL (ref 1.8–2.4)
MCH RBC QN AUTO: 29.2 PG (ref 26.1–32.9)
MCHC RBC AUTO-ENTMCNC: 34.7 G/DL (ref 31.4–35)
MCV RBC AUTO: 84.1 FL (ref 82–102)
MONOCYTES # BLD: 0.4 K/UL (ref 0.1–1.3)
MONOCYTES NFR BLD: 8.3 % (ref 4–12)
NEUTS SEG # BLD: 2.94 K/UL (ref 1.7–8.2)
NEUTS SEG NFR BLD: 60.7 % (ref 43–78)
NITRITE UR QL STRIP.AUTO: NEGATIVE
NRBC # BLD: 0 K/UL (ref 0–0.2)
PH UR STRIP: 6.5 (ref 5–9)
PLATELET # BLD AUTO: 222 K/UL (ref 150–450)
PMV BLD AUTO: 9.1 FL (ref 9.4–12.3)
POTASSIUM SERPL-SCNC: 3.9 MMOL/L (ref 3.5–5.1)
PROT SERPL-MCNC: 6.9 G/DL (ref 6.3–8.2)
PROT UR STRIP-MCNC: NEGATIVE MG/DL
RBC # BLD AUTO: 4.21 M/UL (ref 4.05–5.2)
SARS-COV-2 RDRP RESP QL NAA+PROBE: NOT DETECTED
SERVICE CMNT-IMP: NORMAL
SERVICE CMNT-IMP: NORMAL
SODIUM SERPL-SCNC: 137 MMOL/L (ref 133–143)
SOURCE: NORMAL
SP GR UR REFRACTOMETRY: 1.01 (ref 1–1.02)
UROBILINOGEN UR QL STRIP.AUTO: 1 EU/DL (ref 0.2–1)
WBC # BLD AUTO: 4.8 K/UL (ref 4.3–11.1)

## 2025-04-22 PROCEDURE — 87636 SARSCOV2 & INF A&B AMP PRB: CPT

## 2025-04-22 PROCEDURE — 81003 URINALYSIS AUTO W/O SCOPE: CPT

## 2025-04-22 PROCEDURE — 85025 COMPLETE CBC W/AUTO DIFF WBC: CPT

## 2025-04-22 PROCEDURE — 83735 ASSAY OF MAGNESIUM: CPT

## 2025-04-22 PROCEDURE — 80053 COMPREHEN METABOLIC PANEL: CPT

## 2025-04-22 PROCEDURE — 96374 THER/PROPH/DIAG INJ IV PUSH: CPT

## 2025-04-22 PROCEDURE — 82962 GLUCOSE BLOOD TEST: CPT

## 2025-04-22 PROCEDURE — 6360000002 HC RX W HCPCS: Performed by: EMERGENCY MEDICINE

## 2025-04-22 PROCEDURE — 2580000003 HC RX 258: Performed by: EMERGENCY MEDICINE

## 2025-04-22 PROCEDURE — 99284 EMERGENCY DEPT VISIT MOD MDM: CPT

## 2025-04-22 PROCEDURE — 83690 ASSAY OF LIPASE: CPT

## 2025-04-22 RX ORDER — ONDANSETRON 4 MG/1
4 TABLET, ORALLY DISINTEGRATING ORAL EVERY 8 HOURS PRN
Qty: 12 TABLET | Refills: 0 | Status: SHIPPED | OUTPATIENT
Start: 2025-04-22 | End: 2025-05-04

## 2025-04-22 RX ORDER — ONDANSETRON 2 MG/ML
4 INJECTION INTRAMUSCULAR; INTRAVENOUS
Status: COMPLETED | OUTPATIENT
Start: 2025-04-22 | End: 2025-04-22

## 2025-04-22 RX ORDER — 0.9 % SODIUM CHLORIDE 0.9 %
1000 INTRAVENOUS SOLUTION INTRAVENOUS ONCE
Status: COMPLETED | OUTPATIENT
Start: 2025-04-22 | End: 2025-04-22

## 2025-04-22 RX ADMIN — SODIUM CHLORIDE 1000 ML: 0.9 INJECTION, SOLUTION INTRAVENOUS at 11:25

## 2025-04-22 RX ADMIN — ONDANSETRON 4 MG: 2 INJECTION, SOLUTION INTRAMUSCULAR; INTRAVENOUS at 11:24

## 2025-04-22 ASSESSMENT — LIFESTYLE VARIABLES
HOW MANY STANDARD DRINKS CONTAINING ALCOHOL DO YOU HAVE ON A TYPICAL DAY: PATIENT DOES NOT DRINK
HOW OFTEN DO YOU HAVE A DRINK CONTAINING ALCOHOL: NEVER

## 2025-04-22 ASSESSMENT — PAIN SCALES - GENERAL: PAINLEVEL_OUTOF10: 5

## 2025-04-22 ASSESSMENT — PAIN - FUNCTIONAL ASSESSMENT
PAIN_FUNCTIONAL_ASSESSMENT: NONE - DENIES PAIN
PAIN_FUNCTIONAL_ASSESSMENT: 0-10

## 2025-04-22 ASSESSMENT — PAIN DESCRIPTION - LOCATION: LOCATION: ABDOMEN

## 2025-04-22 ASSESSMENT — PAIN DESCRIPTION - DESCRIPTORS: DESCRIPTORS: CRAMPING

## 2025-04-22 NOTE — DISCHARGE INSTRUCTIONS
Take Zofran as directed.  Please ensure that you remain hydrated.  Schedule close follow-up with your OB/GYN.  Please return to ED if worsening abdominal pain, nausea, vomiting, diarrhea, and inability to tolerate p.o.  Also please return immediately if you develop pelvic pain, vaginal bleeding, etc.    Thank you for choosing us for your care today!  We hope that you feel better soon!

## 2025-04-22 NOTE — ED TRIAGE NOTES
Pt ambulatory to triage with steady gait. Pt reports abdominal cramping and n/v/d that started on Sunday, states she is 12 weeks pregnant. Pt states she has vomited 5 times today. Pt states this is her second pregnancy. Pt denies vaginal bleeding

## 2025-04-22 NOTE — ED PROVIDER NOTES
Emergency Department Provider Note       PCP: Tiffanie Mcneal APRN - CNP   Age: 25 y.o.   Sex: female     DISPOSITION Decision To Discharge 2025 12:30:29 PM    ICD-10-CM    1. Nausea and vomiting, unspecified vomiting type  R11.2 [facility]      2. 12 weeks gestation of pregnancy  Z3A.12 [facility]      3. Rhinorrhea  J34.89       4. Nasal congestion  R09.81           Medical Decision Making     25-year-old  at 12 weeks presents with complaint of persistent nausea, vomiting, diarrhea that first developed yesterday.  States she has had a rhinorrhea, nasal congestion for the past week.  States that she had cereal with milk,  Triscuits and cookie around 8 AM but threw a ball contents.    Patient denies any abdominal pain/pelvic pain/vaginal bleeding at this time.  No indication for formal pelvic ultrasound at this time.    Abdomen soft, nontender with no rebound or guarding.  Vital signs stable.  Afebrile.  Differential diagnosis includes but is not limited to UTI, pyelonephritis, gastroenteritis, BERYL, dehydration, electrolyte abnormality, pancreatitis, viral syndrome, etc.    Bedside ultrasound with IUP with normal fetal movement.  Fetal heart tones 140s.  Creatinine 0.45. Glucose 68.  Remainder of the unremarkable.  Patient given 2 cups of orange juice to drink.  Repeat glucose remained at 68.  WBC 4.8.  H&H 12.3/35.4.  UA negative for UTI.  Patient given 1 L IV fluid bolus, Zofran 4 mg IV.    Patient given peanut butter with crackers.  Repeat glucose stable at 93.  Patient completely asymptomatic and is tolerating p.o. at this time.  Patient with no other complaints.  Case discussed with Dr. Nance who is in agreement for patient be discharged home with Zofran.  Patient instructed to contact her OB/GYN to schedule close follow-up appointment and given strict return precautions.       1 or more acute illnesses that pose a threat to life or bodily

## 2025-04-23 ENCOUNTER — PATIENT MESSAGE (OUTPATIENT)
Dept: OBGYN CLINIC | Age: 25
End: 2025-04-23

## 2025-04-23 RX ORDER — PROMETHAZINE HYDROCHLORIDE 25 MG/1
25 TABLET ORAL EVERY 6 HOURS PRN
Qty: 20 TABLET | Refills: 0 | Status: SHIPPED | OUTPATIENT
Start: 2025-04-23 | End: 2025-04-30

## 2025-04-30 ENCOUNTER — ROUTINE PRENATAL (OUTPATIENT)
Dept: OBGYN CLINIC | Age: 25
End: 2025-04-30
Payer: COMMERCIAL

## 2025-04-30 VITALS — SYSTOLIC BLOOD PRESSURE: 106 MMHG | WEIGHT: 131.1 LBS | BODY MASS INDEX: 24.77 KG/M2 | DIASTOLIC BLOOD PRESSURE: 64 MMHG

## 2025-04-30 DIAGNOSIS — F41.9 ANXIETY AND DEPRESSION: ICD-10-CM

## 2025-04-30 DIAGNOSIS — O21.9 NAUSEA/VOMITING IN PREGNANCY: ICD-10-CM

## 2025-04-30 DIAGNOSIS — F32.A ANXIETY AND DEPRESSION: ICD-10-CM

## 2025-04-30 DIAGNOSIS — Z13.89 SCREENING FOR GENITOURINARY CONDITION: ICD-10-CM

## 2025-04-30 DIAGNOSIS — Z34.81 PRENATAL CARE, SUBSEQUENT PREGNANCY, FIRST TRIMESTER: Primary | ICD-10-CM

## 2025-04-30 DIAGNOSIS — Z72.0 CURRENT EVERY DAY NICOTINE VAPING: ICD-10-CM

## 2025-04-30 DIAGNOSIS — Z13.79 GENETIC SCREENING: ICD-10-CM

## 2025-04-30 DIAGNOSIS — Z86.39 HISTORY OF GRAVES' DISEASE: ICD-10-CM

## 2025-04-30 DIAGNOSIS — Z3A.12 12 WEEKS GESTATION OF PREGNANCY: ICD-10-CM

## 2025-04-30 PROBLEM — O21.0 HYPEREMESIS GRAVIDARUM: Status: RESOLVED | Noted: 2025-04-30 | Resolved: 2025-04-30

## 2025-04-30 PROBLEM — O21.0 HYPEREMESIS GRAVIDARUM: Status: ACTIVE | Noted: 2025-04-30

## 2025-04-30 LAB
BILIRUBIN, URINE, POC: NEGATIVE
BLOOD URINE, POC: NEGATIVE
GLUCOSE URINE, POC: NEGATIVE
KETONES, URINE, POC: NEGATIVE
LEUKOCYTE ESTERASE, URINE, POC: NEGATIVE
NITRITE, URINE, POC: NEGATIVE
PH, URINE, POC: 7 (ref 4.6–8)
PROTEIN,URINE, POC: NEGATIVE
SPECIFIC GRAVITY, URINE, POC: 1.01 (ref 1–1.03)
URINALYSIS CLARITY, POC: CLEAR
URINALYSIS COLOR, POC: YELLOW
UROBILINOGEN, POC: NORMAL MG/DL

## 2025-04-30 PROCEDURE — 81002 URINALYSIS NONAUTO W/O SCOPE: CPT | Performed by: STUDENT IN AN ORGANIZED HEALTH CARE EDUCATION/TRAINING PROGRAM

## 2025-04-30 PROCEDURE — 0500F INITIAL PRENATAL CARE VISIT: CPT | Performed by: STUDENT IN AN ORGANIZED HEALTH CARE EDUCATION/TRAINING PROGRAM

## 2025-04-30 RX ORDER — PANTOPRAZOLE SODIUM 40 MG/1
40 TABLET, DELAYED RELEASE ORAL
Qty: 30 TABLET | Refills: 5 | Status: SHIPPED | OUTPATIENT
Start: 2025-04-30

## 2025-04-30 RX ORDER — METOCLOPRAMIDE 10 MG/1
10 TABLET ORAL EVERY 8 HOURS
Qty: 90 TABLET | Refills: 0 | Status: SHIPPED | OUTPATIENT
Start: 2025-04-30

## 2025-04-30 NOTE — PROGRESS NOTES
Patient presents today for her initial OB exam.  NIPT today.    Patient has noticed increased nausea/vomiting at 7 weeks gestation and has had constant trouble since this. Patient reports throwing up 5-7 times daily, has trouble keeping food and fluids down. Patient has taken phenergan, zofran, b6+unisom, ginger candies with no relief.    Pap smear 6/3/24 NILM. Due 2027.    Patient's last menstrual period was 2025.  Estimated Date of Delivery: 25  OB History:   OB History    Para Term  AB Living   2 1 1 0 0 1   SAB IAB Ectopic Molar Multiple Live Births   0 0 0 0 0 1      # Outcome Date GA Lbr Jorge/2nd Weight Sex Type Anes PTL Lv   2 Current            1 Term 20 40w0d 04:38 / 00:51 3.47 kg (7 lb 10.4 oz) M Vag-Spont EPI N SETH      Name: SARITHA KU      Apgar1: 9  Apgar5: 9       Past Gyn History:   GYN History               Allergies:   Allergies   Allergen Reactions    Clindamycin Rash       Medication History:  Current Outpatient Medications   Medication Sig Dispense Refill    pantoprazole (PROTONIX) 40 MG tablet Take 1 tablet by mouth every morning (before breakfast) 30 tablet 5    metoclopramide (REGLAN) 10 MG tablet Take 1 tablet by mouth every 8 (eight) hours 90 tablet 0    promethazine (PHENERGAN) 25 MG tablet Take 1 tablet by mouth every 6 hours as needed for Nausea 20 tablet 0    ondansetron (ZOFRAN-ODT) 4 MG disintegrating tablet Take 1 tablet by mouth every 8 hours as needed for Nausea or Vomiting 12 tablet 0    Prenatal Multivit-Min-Fe-FA (PRE-YUE PO) Take by mouth      ondansetron (ZOFRAN) 8 MG tablet Take 1 tablet by mouth every 8 hours as needed for Nausea or Vomiting (Patient not taking: Reported on 2025) 20 tablet 2     No current facility-administered medications for this visit.       Past Medical History:  Past Medical History:   Diagnosis Date    Anxiety and depression     Chlamydia 2018    Current every day nicotine vaping 2025    Moshe'

## 2025-05-05 ENCOUNTER — RESULTS FOLLOW-UP (OUTPATIENT)
Dept: OBGYN CLINIC | Age: 25
End: 2025-05-05

## 2025-05-05 ENCOUNTER — ROUTINE PRENATAL (OUTPATIENT)
Dept: OBGYN CLINIC | Age: 25
End: 2025-05-05
Payer: COMMERCIAL

## 2025-05-05 VITALS — WEIGHT: 131.5 LBS | BODY MASS INDEX: 24.85 KG/M2 | SYSTOLIC BLOOD PRESSURE: 118 MMHG | DIASTOLIC BLOOD PRESSURE: 70 MMHG

## 2025-05-05 DIAGNOSIS — Z34.82 PRENATAL CARE, SUBSEQUENT PREGNANCY IN SECOND TRIMESTER: Primary | ICD-10-CM

## 2025-05-05 DIAGNOSIS — Z3A.13 13 WEEKS GESTATION OF PREGNANCY: Primary | ICD-10-CM

## 2025-05-05 DIAGNOSIS — Z13.89 SCREENING FOR GENITOURINARY CONDITION: ICD-10-CM

## 2025-05-05 DIAGNOSIS — Z3A.13 13 WEEKS GESTATION OF PREGNANCY: ICD-10-CM

## 2025-05-05 DIAGNOSIS — O21.9 NAUSEA/VOMITING IN PREGNANCY: ICD-10-CM

## 2025-05-05 LAB
GLUCOSE URINE, POC: NEGATIVE
Lab: NORMAL
NTRA FETAL FRACTION: NORMAL
NTRA GENDER OF FETUS: NORMAL
NTRA MONOSOMY X AGE-BASED RISK TEXT: NORMAL
NTRA MONOSOMY X RESULT TEXT: NORMAL
NTRA MONOSOMY X RISK SCORE TEXT: NORMAL
NTRA TRIPLOIDY RESULT TEXT: NORMAL
NTRA TRISOMY 13 AGE-BASED RISK TEXT: NORMAL
NTRA TRISOMY 13 RESULT TEXT: NORMAL
NTRA TRISOMY 13 RISK SCORE TEXT: NORMAL
NTRA TRISOMY 18 AGE-BASED RISK TEXT: NORMAL
NTRA TRISOMY 18 RESULT TEXT: NORMAL
NTRA TRISOMY 18 RISK SCORE TEXT: NORMAL
NTRA TRISOMY 21 AGE-BASED RISK TEXT: NORMAL
NTRA TRISOMY 21 RESULT TEXT: NORMAL
NTRA TRISOMY 21 RISK SCORE TEXT: NORMAL
PROTEIN,URINE, POC: NEGATIVE

## 2025-05-05 PROCEDURE — 0502F SUBSEQUENT PRENATAL CARE: CPT | Performed by: STUDENT IN AN ORGANIZED HEALTH CARE EDUCATION/TRAINING PROGRAM

## 2025-05-05 PROCEDURE — 81002 URINALYSIS NONAUTO W/O SCOPE: CPT | Performed by: STUDENT IN AN ORGANIZED HEALTH CARE EDUCATION/TRAINING PROGRAM

## 2025-05-05 NOTE — PROGRESS NOTES
25 y.o.  at 13w4d  who is here for routine OB visit.  Pt is doing well, with no complications.    Feeling much better with n/v regimen.    HISTORY:  OB History    Para Term  AB Living   2 1 1 0 0 1   SAB IAB Ectopic Molar Multiple Live Births        1      # Outcome Date GA Lbr Jorge/2nd Weight Sex Type Anes PTL Lv   2 Current            1 Term 20 40w0d 04:38 / 00:51 3.47 kg (7 lb 10.4 oz) M Vag-Spont EPI N SETH      Patient's last menstrual period was 2025.  Social History     Substance and Sexual Activity   Sexual Activity Yes    Partners: Male    Birth control/protection: None      Past Medical History:   Diagnosis Date    Anxiety and depression     Chlamydia 2018    Current every day nicotine vaping 2025    Graves' disease     Hyperthyroidism     Schizoaffective disorder (HCC)     Vaginal bleeding before 22 weeks gestation 2020      Past Surgical History:   Procedure Laterality Date    ADENOIDECTOMY      TYMPANOSTOMY TUBE PLACEMENT      WISDOM TOOTH EXTRACTION         ROS:  Feeling well. No dyspnea or chest pain on exertion.  No abdominal pain, change in bowel habits, black or bloody stools.  No urinary tract symptoms.   OB ROS: No vaginal bleeding, cxns, LOF, decreased FM. No HA, vision changes, abdominal pain.    PHYSICAL EXAM:  /70   Wt 59.6 kg (131 lb 8 oz)   LMP 2025   BMI 24.85 kg/m²        ASSESSMENT / PLAN:  1. Prenatal care, subsequent pregnancy in second trimester  -     AMB POC OB URINE DIP  2. 13 weeks gestation of pregnancy  Overview:  NIPT(no gender): collected 25  GTT:  Flu:  Tdap:  Orders:  -     AMB POC OB URINE DIP  3. Screening for genitourinary condition  -     AMB POC OB URINE DIP  4. Nausea/vomiting in pregnancy  Overview:  25: Patient has noticed increased nausea/vomiting at 7 weeks gestation and has had constant trouble since this. Patient reports throwing up 5-7 times daily, has trouble keeping food and fluids down.

## 2025-05-15 ENCOUNTER — HOSPITAL ENCOUNTER (EMERGENCY)
Age: 25
Discharge: HOME | End: 2025-05-16
Attending: EMERGENCY MEDICINE
Payer: COMMERCIAL

## 2025-05-15 DIAGNOSIS — K92.0 HEMATEMESIS WITH NAUSEA: Primary | ICD-10-CM

## 2025-05-15 LAB
ERYTHROCYTE [DISTWIDTH] IN BLOOD BY AUTOMATED COUNT: 13.1 % (ref 11.9–14.6)
HCT VFR BLD AUTO: 35 % (ref 35.8–46.3)
HGB BLD-MCNC: 12.1 G/DL (ref 11.7–15.4)
MCH RBC QN AUTO: 28.9 PG (ref 26.1–32.9)
MCHC RBC AUTO-ENTMCNC: 34.6 G/DL (ref 31.4–35)
MCV RBC AUTO: 83.7 FL (ref 82–102)
NRBC # BLD: 0 K/UL (ref 0–0.2)
PLATELET # BLD AUTO: 254 K/UL (ref 150–450)
PMV BLD AUTO: 9.2 FL (ref 9.4–12.3)
RBC # BLD AUTO: 4.18 M/UL (ref 4.05–5.2)
WBC # BLD AUTO: 9.2 K/UL (ref 4.3–11.1)

## 2025-05-15 PROCEDURE — 80048 BASIC METABOLIC PNL TOTAL CA: CPT

## 2025-05-15 PROCEDURE — 85027 COMPLETE CBC AUTOMATED: CPT

## 2025-05-15 PROCEDURE — 99284 EMERGENCY DEPT VISIT MOD MDM: CPT

## 2025-05-15 PROCEDURE — 6360000002 HC RX W HCPCS: Performed by: EMERGENCY MEDICINE

## 2025-05-15 PROCEDURE — 96374 THER/PROPH/DIAG INJ IV PUSH: CPT

## 2025-05-15 PROCEDURE — 6370000000 HC RX 637 (ALT 250 FOR IP): Performed by: EMERGENCY MEDICINE

## 2025-05-15 RX ORDER — MAGNESIUM HYDROXIDE/ALUMINUM HYDROXICE/SIMETHICONE 120; 1200; 1200 MG/30ML; MG/30ML; MG/30ML
30 SUSPENSION ORAL
Status: COMPLETED | OUTPATIENT
Start: 2025-05-15 | End: 2025-05-15

## 2025-05-15 RX ORDER — ONDANSETRON 2 MG/ML
4 INJECTION INTRAMUSCULAR; INTRAVENOUS
Status: COMPLETED | OUTPATIENT
Start: 2025-05-15 | End: 2025-05-15

## 2025-05-15 RX ADMIN — ONDANSETRON 4 MG: 2 INJECTION, SOLUTION INTRAMUSCULAR; INTRAVENOUS at 23:28

## 2025-05-15 RX ADMIN — ALUMINUM HYDROXIDE, MAGNESIUM HYDROXIDE, AND SIMETHICONE 30 ML: 200; 200; 20 SUSPENSION ORAL at 23:28

## 2025-05-15 ASSESSMENT — PAIN - FUNCTIONAL ASSESSMENT: PAIN_FUNCTIONAL_ASSESSMENT: NONE - DENIES PAIN

## 2025-05-15 ASSESSMENT — PAIN SCALES - GENERAL: PAINLEVEL_OUTOF10: 5

## 2025-05-15 ASSESSMENT — LIFESTYLE VARIABLES: HOW MANY STANDARD DRINKS CONTAINING ALCOHOL DO YOU HAVE ON A TYPICAL DAY: PATIENT DOES NOT DRINK

## 2025-05-16 VITALS
HEART RATE: 89 BPM | TEMPERATURE: 98.1 F | OXYGEN SATURATION: 100 % | DIASTOLIC BLOOD PRESSURE: 76 MMHG | RESPIRATION RATE: 19 BRPM | SYSTOLIC BLOOD PRESSURE: 116 MMHG | HEIGHT: 61 IN | WEIGHT: 133 LBS | BODY MASS INDEX: 25.11 KG/M2

## 2025-05-16 LAB
ANION GAP SERPL CALC-SCNC: 11 MMOL/L (ref 7–16)
BUN SERPL-MCNC: 6 MG/DL (ref 6–23)
CALCIUM SERPL-MCNC: 9.4 MG/DL (ref 8.8–10.2)
CHLORIDE SERPL-SCNC: 104 MMOL/L (ref 98–107)
CO2 SERPL-SCNC: 23 MMOL/L (ref 20–29)
CREAT SERPL-MCNC: 0.44 MG/DL (ref 0.8–1.3)
GLUCOSE SERPL-MCNC: 88 MG/DL (ref 65–100)
POTASSIUM SERPL-SCNC: 3.7 MMOL/L (ref 3.5–5.1)
SODIUM SERPL-SCNC: 138 MMOL/L (ref 133–143)

## 2025-05-16 ASSESSMENT — PAIN SCALES - GENERAL: PAINLEVEL_OUTOF10: 3

## 2025-05-16 ASSESSMENT — PAIN - FUNCTIONAL ASSESSMENT: PAIN_FUNCTIONAL_ASSESSMENT: 0-10

## 2025-05-16 NOTE — DISCHARGE INSTRUCTIONS
Follow-up with your OB/GYN to have a hemoglobin and hematocrit rechecked on Monday or Tuesday.  Return if you vomit more blood or coffee-ground material.  Monitor your stool to evaluate for blood or black tarry slimy stool.  Return to ER to the nearest emergency department for worsening bleeding.

## 2025-05-16 NOTE — ED TRIAGE NOTES
Pt states that she is approx 15 weeks pregnant and had a sudden onset of nausea and vomiting with 4-5 episodes of vomiting.  States that she s concerned that she may had blood in her emesis

## 2025-05-16 NOTE — ED NOTES
I have reviewed discharge instructions with the patient.  The patient verbalized understanding.    Patient left ED via Discharge Method: ambulatory to Home with mother and son.    Opportunity for questions and clarification provided.       Patient given 0 scripts.

## 2025-05-16 NOTE — ED PROVIDER NOTES
Emergency Department Provider Note       [facility] EMERGENCY DEPT   PCP: Tiffanie Mcneal, APRN - MELQUIADES   Age: 25 y.o.   Sex: female     DISPOSITION Decision To Discharge 05/16/2025 12:17:46 AM    ICD-10-CM    1. Hematemesis with nausea  K92.0           Medical Decision Making     Presentation concerning for a Isatu-Elise tear.  Will treat her nausea check her H&H.  Clinically she does not appear pale and her conjunctive appear normal.  Her abdominal exam is benign.  She is not having any vaginal bleeding or suggestion of any problems with the pregnancy at this time.    12:20 AM  Hemoglobin is normal and stable.  Do not feel patient needs admission at this time.  Do not feel she needs outpatient EGD unless this problem persist.  Likely a small Isatu-Elise tear as she vomited multiple times this evening before seeing blood.  She also reports vomiting violently.  No vomiting while in the ED.  Vital stable.  Return precautions provided     1 or more acute illnesses that pose a threat to life or bodily function.   Patient was discharged risks and benefits of hospitalization were considered.  Shared medical decision making was utilized in creating the patients health plan today.  I independently ordered and reviewed each unique test.                         History     25-year-old female is 15 weeks pregnant presents with concern for vomiting blood.  She has had a a lot of difficulty with nausea vomiting and this presidency mostly early on but tonight started feel like she was having acid reflux followed by what she describes as \"violent\" vomiting on several occasions followed at the end by vomiting of brownish-reddish material and a metallic blood type taste in her mouth.  She called her OB/GYN and they asked her to come to the emergency department for evaluation.  She has persistent nausea.  She has not had any melena or hematochezia.  She has no abdominal pain.  She denies dysuria urgency or frequency.  She denies  PG    MCHC 34.6 31.4 - 35.0 g/dL    RDW 13.1 11.9 - 14.6 %    Platelets 254 150 - 450 K/uL    MPV 9.2 (L) 9.4 - 12.3 FL    nRBC 0.00 0.0 - 0.2 K/uL   Basic Metabolic Panel   Result Value Ref Range    Sodium 138 133 - 143 mmol/L    Potassium 3.7 3.5 - 5.1 mmol/L    Chloride 104 98 - 107 mmol/L    CO2 23 20 - 29 mmol/L    Anion Gap 11 7 - 16 mmol/L    Glucose 88 65 - 100 mg/dL    BUN 6 6 - 23 MG/DL    Creatinine 0.44 (L) 0.80 - 1.30 MG/DL    Est, Glom Filt Rate >90 >60 ml/min/1.73m2    Calcium 9.4 8.8 - 10.2 MG/DL   Bedside Ultrasound    Narrative    Jaycob Thakur MD     5/16/2025 12:19 AM  Bedside Ultrasound    Date/Time: 5/16/2025 12:16 AM    Performed by: Jaycob Thakur MD  Authorized by: Jaycob Thakur MD    Written consent obtained: No    Verbal consent obtained: Yes    Given by:  Parent  Performed by:  Attending  Type of procedure:  Focused pelvic ultrasound obstetrical  Indications:  Pregnant by patient history (Patient requested ultrasound   versus fetal heart tones)  Transabdominal sagittal:  Adequate  Transabdominal transverse:  Adequate  Endovaginal sagittal:  Not obtained  Endovaginal coronal:  Not obtained  Fetal heart    FHR (bpm):  132  Interpretation:  Live intrauterine pregnancy  Transabdominal sagittal:  Adequate  Transabdominal transverse:  Adequate  Endovaginal sagittal:  Not obtained  Endovaginal coronal:  Not obtained  Comments:      Fetal movement, fetal heart active 132         No orders to display                No results for input(s): \"COVID19\" in the last 72 hours.     Voice dictation software was used during the making of this note.  This software is not perfect and grammatical and other typographical errors may be present.  This note has not been completely proofread for errors.     Jaycob Thakur MD  05/16/25 0019       Jaycob Thakur MD  05/16/25 0020

## 2025-05-28 ENCOUNTER — ROUTINE PRENATAL (OUTPATIENT)
Dept: OBGYN CLINIC | Age: 25
End: 2025-05-28

## 2025-05-28 VITALS — BODY MASS INDEX: 25.2 KG/M2 | DIASTOLIC BLOOD PRESSURE: 60 MMHG | WEIGHT: 133.3 LBS | SYSTOLIC BLOOD PRESSURE: 112 MMHG

## 2025-05-28 DIAGNOSIS — Z34.82 PRENATAL CARE, SUBSEQUENT PREGNANCY, SECOND TRIMESTER: Primary | ICD-10-CM

## 2025-05-28 DIAGNOSIS — Z13.89 SCREENING FOR GENITOURINARY CONDITION: ICD-10-CM

## 2025-05-28 DIAGNOSIS — Z3A.16 16 WEEKS GESTATION OF PREGNANCY: ICD-10-CM

## 2025-05-28 DIAGNOSIS — O21.9 NAUSEA/VOMITING IN PREGNANCY: ICD-10-CM

## 2025-05-28 LAB
GLUCOSE URINE, POC: NEGATIVE
PROTEIN,URINE, POC: NEGATIVE

## 2025-05-28 PROCEDURE — 0502F SUBSEQUENT PRENATAL CARE: CPT | Performed by: NURSE PRACTITIONER

## 2025-05-28 RX ORDER — METOCLOPRAMIDE 10 MG/1
10 TABLET ORAL EVERY 8 HOURS
Qty: 90 TABLET | Refills: 0 | Status: SHIPPED | OUTPATIENT
Start: 2025-05-28

## 2025-05-28 RX ORDER — ONDANSETRON 8 MG/1
8 TABLET, FILM COATED ORAL EVERY 8 HOURS PRN
Qty: 20 TABLET | Refills: 2 | Status: SHIPPED | OUTPATIENT
Start: 2025-05-28

## 2025-05-28 SDOH — ECONOMIC STABILITY: FOOD INSECURITY: WITHIN THE PAST 12 MONTHS, YOU WORRIED THAT YOUR FOOD WOULD RUN OUT BEFORE YOU GOT MONEY TO BUY MORE.: NEVER TRUE

## 2025-05-28 SDOH — ECONOMIC STABILITY: FOOD INSECURITY: WITHIN THE PAST 12 MONTHS, THE FOOD YOU BOUGHT JUST DIDN'T LAST AND YOU DIDN'T HAVE MONEY TO GET MORE.: NEVER TRUE

## 2025-05-28 SDOH — ECONOMIC STABILITY: TRANSPORTATION INSECURITY
IN THE PAST 12 MONTHS, HAS THE LACK OF TRANSPORTATION KEPT YOU FROM MEDICAL APPOINTMENTS OR FROM GETTING MEDICATIONS?: NO

## 2025-05-28 SDOH — ECONOMIC STABILITY: TRANSPORTATION INSECURITY
IN THE PAST 12 MONTHS, HAS LACK OF TRANSPORTATION KEPT YOU FROM MEETINGS, WORK, OR FROM GETTING THINGS NEEDED FOR DAILY LIVING?: NO

## 2025-05-28 SDOH — ECONOMIC STABILITY: INCOME INSECURITY: IN THE LAST 12 MONTHS, WAS THERE A TIME WHEN YOU WERE NOT ABLE TO PAY THE MORTGAGE OR RENT ON TIME?: NO

## 2025-06-20 NOTE — PROGRESS NOTES
Anatomy Findings from Today (06/23/2025):  ANATOMY   FHT= 145   POSITION= Breech   Anatomy scan not complete. Spine not visualized due to fetal position. Suboptimal visualization of feet due to breech position.   All other visualized anatomy WNL.   Posterior placenta grade 0, no previa visualized. Eccentric PCI.   CL= 3.7 cm   Gender: Visualized, pt does not want to know.   OV= 40%   AC= 49%

## 2025-06-23 ENCOUNTER — ROUTINE PRENATAL (OUTPATIENT)
Dept: OBGYN CLINIC | Age: 25
End: 2025-06-23
Payer: COMMERCIAL

## 2025-06-23 ENCOUNTER — PROCEDURE VISIT (OUTPATIENT)
Dept: OBGYN CLINIC | Age: 25
End: 2025-06-23
Payer: COMMERCIAL

## 2025-06-23 VITALS — DIASTOLIC BLOOD PRESSURE: 62 MMHG | WEIGHT: 132.6 LBS | SYSTOLIC BLOOD PRESSURE: 104 MMHG | BODY MASS INDEX: 25.07 KG/M2

## 2025-06-23 DIAGNOSIS — Z36.3 ENCOUNTER FOR ROUTINE SCREENING FOR FETAL MALFORMATION USING ULTRASOUND: Primary | ICD-10-CM

## 2025-06-23 DIAGNOSIS — Z34.92 PRENATAL CARE, SECOND TRIMESTER: Primary | ICD-10-CM

## 2025-06-23 DIAGNOSIS — Z13.89 SCREENING FOR GENITOURINARY CONDITION: ICD-10-CM

## 2025-06-23 DIAGNOSIS — Z3A.20 20 WEEKS GESTATION OF PREGNANCY: ICD-10-CM

## 2025-06-23 LAB
GLUCOSE URINE, POC: NEGATIVE
PROTEIN,URINE, POC: NEGATIVE

## 2025-06-23 PROCEDURE — 76805 OB US >/= 14 WKS SNGL FETUS: CPT | Performed by: OBSTETRICS & GYNECOLOGY

## 2025-06-23 PROCEDURE — 81002 URINALYSIS NONAUTO W/O SCOPE: CPT | Performed by: OBSTETRICS & GYNECOLOGY

## 2025-06-23 PROCEDURE — 0502F SUBSEQUENT PRENATAL CARE: CPT | Performed by: OBSTETRICS & GYNECOLOGY

## 2025-06-23 NOTE — PROGRESS NOTES
Discussed ultrasound.  Please see full report under imaging.  Will get back next month for follow-up anatomy scan.

## 2025-07-09 ENCOUNTER — CLINICAL SUPPORT (OUTPATIENT)
Dept: OBGYN CLINIC | Age: 25
End: 2025-07-09

## 2025-07-09 VITALS — WEIGHT: 138.5 LBS | BODY MASS INDEX: 26.18 KG/M2

## 2025-07-09 DIAGNOSIS — R39.9 UTI SYMPTOMS: Primary | ICD-10-CM

## 2025-07-14 ENCOUNTER — ROUTINE PRENATAL (OUTPATIENT)
Dept: OBGYN CLINIC | Age: 25
End: 2025-07-14
Payer: COMMERCIAL

## 2025-07-14 VITALS — SYSTOLIC BLOOD PRESSURE: 120 MMHG | DIASTOLIC BLOOD PRESSURE: 64 MMHG | WEIGHT: 136.2 LBS | BODY MASS INDEX: 25.75 KG/M2

## 2025-07-14 DIAGNOSIS — Z3A.23 23 WEEKS GESTATION OF PREGNANCY: ICD-10-CM

## 2025-07-14 DIAGNOSIS — O26.892 PREGNANCY HEADACHE IN SECOND TRIMESTER: ICD-10-CM

## 2025-07-14 DIAGNOSIS — R51.9 PREGNANCY HEADACHE IN SECOND TRIMESTER: ICD-10-CM

## 2025-07-14 DIAGNOSIS — R11.0 NAUSEA: Primary | ICD-10-CM

## 2025-07-14 DIAGNOSIS — R11.0 NAUSEA: ICD-10-CM

## 2025-07-14 DIAGNOSIS — R51.9 NONINTRACTABLE HEADACHE, UNSPECIFIED CHRONICITY PATTERN, UNSPECIFIED HEADACHE TYPE: ICD-10-CM

## 2025-07-14 LAB
BASOPHILS # BLD: 0.03 K/UL (ref 0–0.2)
BASOPHILS NFR BLD: 0.3 % (ref 0–2)
CREAT UR-MCNC: 63.6 MG/DL (ref 28–217)
DIFFERENTIAL METHOD BLD: ABNORMAL
EOSINOPHIL # BLD: 0.12 K/UL (ref 0–0.8)
EOSINOPHIL NFR BLD: 1.1 % (ref 0.5–7.8)
ERYTHROCYTE [DISTWIDTH] IN BLOOD BY AUTOMATED COUNT: 12.8 % (ref 11.9–14.6)
GLUCOSE URINE, POC: NEGATIVE
HCT VFR BLD AUTO: 33.3 % (ref 35.8–46.3)
HGB BLD-MCNC: 11 G/DL (ref 11.7–15.4)
IMM GRANULOCYTES # BLD AUTO: 0.05 K/UL (ref 0–0.5)
IMM GRANULOCYTES NFR BLD AUTO: 0.5 % (ref 0–5)
LYMPHOCYTES # BLD: 1.92 K/UL (ref 0.5–4.6)
LYMPHOCYTES NFR BLD: 17.6 % (ref 13–44)
MCH RBC QN AUTO: 29 PG (ref 26.1–32.9)
MCHC RBC AUTO-ENTMCNC: 33 G/DL (ref 31.4–35)
MCV RBC AUTO: 87.9 FL (ref 82–102)
MONOCYTES # BLD: 0.69 K/UL (ref 0.1–1.3)
MONOCYTES NFR BLD: 6.3 % (ref 4–12)
NEUTS SEG # BLD: 8.1 K/UL (ref 1.7–8.2)
NEUTS SEG NFR BLD: 74.2 % (ref 43–78)
NRBC # BLD: 0 K/UL (ref 0–0.2)
PLATELET # BLD AUTO: 250 K/UL (ref 150–450)
PMV BLD AUTO: 9.9 FL (ref 9.4–12.3)
PROT UR-MCNC: <6 MG/DL
PROT/CREAT UR-RTO: NORMAL
PROTEIN,URINE, POC: NEGATIVE
RBC # BLD AUTO: 3.79 M/UL (ref 4.05–5.2)
WBC # BLD AUTO: 10.9 K/UL (ref 4.3–11.1)

## 2025-07-14 PROCEDURE — 4004F PT TOBACCO SCREEN RCVD TLK: CPT | Performed by: NURSE PRACTITIONER

## 2025-07-14 PROCEDURE — G8419 CALC BMI OUT NRM PARAM NOF/U: HCPCS | Performed by: NURSE PRACTITIONER

## 2025-07-14 PROCEDURE — 99213 OFFICE O/P EST LOW 20 MIN: CPT | Performed by: NURSE PRACTITIONER

## 2025-07-14 PROCEDURE — G8427 DOCREV CUR MEDS BY ELIG CLIN: HCPCS | Performed by: NURSE PRACTITIONER

## 2025-07-14 PROCEDURE — 81002 URINALYSIS NONAUTO W/O SCOPE: CPT | Performed by: NURSE PRACTITIONER

## 2025-07-14 NOTE — PROGRESS NOTES
Eri Valero is a 25 y.o.  at 23w4d  who is seen for headaches and nausea/vomiting in pregnancy. Pt states HA are not resolved with Tylenol. Doesn't think she has a h/o migraines. Denies aura. Denies RUQ pain.  Has a hard time drinking enough water as she works at a . Pt reports hasn't noticed as much movement with headaches. Meeting AUBREY. Notes she notices baby tends to move more in the AM and evenings.     HISTORY:    OB History          2    Para   1    Term   1       0    AB   0    Living   1         SAB        IAB        Ectopic        Molar        Multiple        Live Births   1               Patient's last menstrual period was 2025.  Social History     Substance and Sexual Activity   Sexual Activity Yes    Partners: Male    Birth control/protection: None        ROS:  Feeling well. No dyspnea or chest pain on exertion.  No abdominal pain, change in bowel habits, black or bloody stools.  No urinary tract symptoms.   OB ROS: No abdominal pain  , chest pain, contractions, fever, pelvic pressure, right upper quadrant pain  , shortness of breath, swelling, vaginal bleeding , and vaginal leaking of fluid .    PHYSICAL EXAM:  Blood pressure 120/64, weight 61.8 kg (136 lb 3.2 oz), last menstrual period 2025.    The patient appears well, alert, oriented x 3.  Lungs are clear. Heart RRR, no murmurs.       ASSESSMENT:  Encounter Diagnoses   Name Primary?    Nausea Yes    Nonintractable headache, unspecified chronicity pattern, unspecified headache type     Pregnancy as incidental finding        PLAN:    Reassured meeting AUBREY  Continue to monitor movement- if not meeting criteria- US  Hydrate   Labs today to rule out PE, but reassured BP WNL and no proteinuria  Discussed low risk of PE in 2nd trimester, but will exercise abundance of caution  Excedrin tension encouraged and could also consider adding magnesium if needed  Will call pt with results and manage accordingly

## 2025-07-15 LAB
ALBUMIN SERPL-MCNC: 2.8 G/DL (ref 3.5–5)
ALBUMIN/GLOB SERPL: 0.8 (ref 1–1.9)
ALP SERPL-CCNC: 75 U/L (ref 35–104)
ALT SERPL-CCNC: 8 U/L (ref 8–45)
ANION GAP SERPL CALC-SCNC: 11 MMOL/L (ref 7–16)
AST SERPL-CCNC: 19 U/L (ref 15–37)
BILIRUB SERPL-MCNC: 0.2 MG/DL (ref 0–1.2)
BUN SERPL-MCNC: 7 MG/DL (ref 6–23)
CALCIUM SERPL-MCNC: 9.1 MG/DL (ref 8.8–10.2)
CHLORIDE SERPL-SCNC: 104 MMOL/L (ref 98–107)
CO2 SERPL-SCNC: 21 MMOL/L (ref 20–29)
CREAT SERPL-MCNC: 0.48 MG/DL (ref 0.6–1.1)
GLOBULIN SER CALC-MCNC: 3.6 G/DL (ref 2.3–3.5)
GLUCOSE SERPL-MCNC: 73 MG/DL (ref 70–99)
LDH SERPL L TO P-CCNC: 158 U/L (ref 127–281)
POTASSIUM SERPL-SCNC: 4.1 MMOL/L (ref 3.5–5.1)
PROT SERPL-MCNC: 6.4 G/DL (ref 6.3–8.2)
SODIUM SERPL-SCNC: 137 MMOL/L (ref 136–145)
URATE SERPL-MCNC: 2.2 MG/DL (ref 2.5–7.1)

## 2025-07-18 NOTE — PROGRESS NOTES
Patient is doing well. No complaints. RTC 4 weeks for glucola.     US findings today 7/21/2025:  ANATOMY F/U    FHT= 142  POSITION= Pa breech    Anatomy scan complete. All visualized anatomy WNL. Feet and spine visualized today.     Posterior placenta grade 0    Gender= PT DOES NOT WANT TO KNOW    OV= 38%  AC= 35%

## 2025-07-21 ENCOUNTER — ROUTINE PRENATAL (OUTPATIENT)
Dept: OBGYN CLINIC | Age: 25
End: 2025-07-21

## 2025-07-21 ENCOUNTER — PROCEDURE VISIT (OUTPATIENT)
Dept: OBGYN CLINIC | Age: 25
End: 2025-07-21
Payer: COMMERCIAL

## 2025-07-21 VITALS — BODY MASS INDEX: 26.26 KG/M2 | WEIGHT: 138.9 LBS | SYSTOLIC BLOOD PRESSURE: 114 MMHG | DIASTOLIC BLOOD PRESSURE: 66 MMHG

## 2025-07-21 DIAGNOSIS — Z3A.24 24 WEEKS GESTATION OF PREGNANCY: ICD-10-CM

## 2025-07-21 DIAGNOSIS — Z13.89 SCREENING FOR GENITOURINARY CONDITION: ICD-10-CM

## 2025-07-21 DIAGNOSIS — Z34.92 PRENATAL CARE, SECOND TRIMESTER: Primary | ICD-10-CM

## 2025-07-21 DIAGNOSIS — Z36.2 ENCOUNTER FOR FOLLOW-UP ULTRASOUND OF FETAL ANATOMY: Primary | ICD-10-CM

## 2025-07-21 LAB
GLUCOSE URINE, POC: NEGATIVE
PROTEIN,URINE, POC: NEGATIVE

## 2025-07-21 PROCEDURE — 0502F SUBSEQUENT PRENATAL CARE: CPT | Performed by: NURSE PRACTITIONER

## 2025-07-21 PROCEDURE — 76816 OB US FOLLOW-UP PER FETUS: CPT | Performed by: OBSTETRICS & GYNECOLOGY

## 2025-08-19 ENCOUNTER — CLINICAL SUPPORT (OUTPATIENT)
Dept: OBGYN CLINIC | Age: 25
End: 2025-08-19

## 2025-08-19 ENCOUNTER — ROUTINE PRENATAL (OUTPATIENT)
Dept: OBGYN CLINIC | Age: 25
End: 2025-08-19

## 2025-08-19 VITALS
HEIGHT: 60 IN | BODY MASS INDEX: 27.98 KG/M2 | SYSTOLIC BLOOD PRESSURE: 108 MMHG | WEIGHT: 142.5 LBS | DIASTOLIC BLOOD PRESSURE: 76 MMHG

## 2025-08-19 DIAGNOSIS — Z11.3 SCREENING EXAMINATION FOR STD (SEXUALLY TRANSMITTED DISEASE): ICD-10-CM

## 2025-08-19 DIAGNOSIS — Z3A.28 28 WEEKS GESTATION OF PREGNANCY: ICD-10-CM

## 2025-08-19 DIAGNOSIS — O21.9 NAUSEA/VOMITING IN PREGNANCY: ICD-10-CM

## 2025-08-19 DIAGNOSIS — Z34.93 PRENATAL CARE, THIRD TRIMESTER: ICD-10-CM

## 2025-08-19 DIAGNOSIS — Z86.39 HISTORY OF GRAVES' DISEASE: Primary | ICD-10-CM

## 2025-08-19 DIAGNOSIS — Z13.0 SCREENING, ANEMIA, DEFICIENCY, IRON: ICD-10-CM

## 2025-08-19 DIAGNOSIS — Z13.1 SCREENING FOR DIABETES MELLITUS: ICD-10-CM

## 2025-08-19 DIAGNOSIS — Z13.89 SCREENING FOR GENITOURINARY CONDITION: ICD-10-CM

## 2025-08-19 LAB
BASOPHILS # BLD: 0.02 K/UL (ref 0–0.2)
BASOPHILS NFR BLD: 0.2 % (ref 0–2)
DIFFERENTIAL METHOD BLD: ABNORMAL
EOSINOPHIL # BLD: 0.08 K/UL (ref 0–0.8)
EOSINOPHIL NFR BLD: 0.9 % (ref 0.5–7.8)
ERYTHROCYTE [DISTWIDTH] IN BLOOD BY AUTOMATED COUNT: 12.1 % (ref 11.9–14.6)
GLUCOSE 1 HOUR: 98 MG/DL (ref 70–140)
GLUCOSE URINE, POC: NEGATIVE
HCT VFR BLD AUTO: 32.4 % (ref 35.8–46.3)
HGB BLD-MCNC: 10.2 G/DL (ref 11.7–15.4)
IMM GRANULOCYTES # BLD AUTO: 0.07 K/UL (ref 0–0.5)
IMM GRANULOCYTES NFR BLD AUTO: 0.8 % (ref 0–5)
LYMPHOCYTES # BLD: 1.95 K/UL (ref 0.5–4.6)
LYMPHOCYTES NFR BLD: 21 % (ref 13–44)
MCH RBC QN AUTO: 27.2 PG (ref 26.1–32.9)
MCHC RBC AUTO-ENTMCNC: 31.5 G/DL (ref 31.4–35)
MCV RBC AUTO: 86.4 FL (ref 82–102)
MONOCYTES # BLD: 0.5 K/UL (ref 0.1–1.3)
MONOCYTES NFR BLD: 5.4 % (ref 4–12)
NEUTS SEG # BLD: 6.66 K/UL (ref 1.7–8.2)
NEUTS SEG NFR BLD: 71.7 % (ref 43–78)
NRBC # BLD: 0 K/UL (ref 0–0.2)
PLATELET # BLD AUTO: 236 K/UL (ref 150–450)
PMV BLD AUTO: 10.3 FL (ref 9.4–12.3)
PROTEIN,URINE, POC: NEGATIVE
RBC # BLD AUTO: 3.75 M/UL (ref 4.05–5.2)
T PALLIDUM AB SER QL IA: NONREACTIVE
WBC # BLD AUTO: 9.3 K/UL (ref 4.3–11.1)

## 2025-08-19 PROCEDURE — 0502F SUBSEQUENT PRENATAL CARE: CPT | Performed by: OBSTETRICS & GYNECOLOGY

## 2025-08-20 PROBLEM — O99.019 ANEMIA AFFECTING PREGNANCY: Status: ACTIVE | Noted: 2025-08-20

## 2025-08-27 ENCOUNTER — ROUTINE PRENATAL (OUTPATIENT)
Dept: OBGYN CLINIC | Age: 25
End: 2025-08-27

## 2025-08-27 VITALS — WEIGHT: 145.9 LBS | SYSTOLIC BLOOD PRESSURE: 108 MMHG | BODY MASS INDEX: 28.49 KG/M2 | DIASTOLIC BLOOD PRESSURE: 64 MMHG

## 2025-08-27 DIAGNOSIS — Z34.93 PRENATAL CARE, THIRD TRIMESTER: ICD-10-CM

## 2025-08-27 DIAGNOSIS — N89.8 VAGINAL DISCHARGE: Primary | ICD-10-CM

## 2025-08-27 DIAGNOSIS — R10.2 PELVIC PRESSURE IN PREGNANCY: ICD-10-CM

## 2025-08-27 DIAGNOSIS — Z3A.29 29 WEEKS GESTATION OF PREGNANCY: ICD-10-CM

## 2025-08-27 DIAGNOSIS — R82.998 LEUKOCYTES IN URINE: ICD-10-CM

## 2025-08-27 DIAGNOSIS — O26.899 PELVIC PRESSURE IN PREGNANCY: ICD-10-CM

## 2025-08-27 DIAGNOSIS — Z13.89 SCREENING FOR GENITOURINARY CONDITION: ICD-10-CM

## 2025-08-27 LAB
BILIRUBIN, URINE, POC: NEGATIVE
BLOOD URINE, POC: NEGATIVE
GLUCOSE URINE, POC: NEGATIVE
GLUCOSE URINE, POC: NEGATIVE
KETONES, URINE, POC: NEGATIVE
LEUKOCYTE ESTERASE, URINE, POC: NORMAL
NITRITE, URINE, POC: NEGATIVE
PH, URINE, POC: 7.5 (ref 4.6–8)
PROTEIN,URINE, POC: NEGATIVE
PROTEIN,URINE, POC: NEGATIVE
SPECIFIC GRAVITY, URINE, POC: 1.01 (ref 1–1.03)
URINALYSIS CLARITY, POC: CLEAR
URINALYSIS COLOR, POC: YELLOW
UROBILINOGEN, POC: NORMAL MG/DL

## 2025-08-27 PROCEDURE — G8427 DOCREV CUR MEDS BY ELIG CLIN: HCPCS | Performed by: NURSE PRACTITIONER

## 2025-08-27 PROCEDURE — 0502F SUBSEQUENT PRENATAL CARE: CPT | Performed by: NURSE PRACTITIONER

## 2025-08-27 PROCEDURE — 4004F PT TOBACCO SCREEN RCVD TLK: CPT | Performed by: NURSE PRACTITIONER

## 2025-08-27 PROCEDURE — G8419 CALC BMI OUT NRM PARAM NOF/U: HCPCS | Performed by: NURSE PRACTITIONER

## 2025-08-27 RX ORDER — METRONIDAZOLE 500 MG/1
500 TABLET ORAL 2 TIMES DAILY
Qty: 14 TABLET | Refills: 0 | Status: SHIPPED | OUTPATIENT
Start: 2025-08-27 | End: 2025-09-03

## 2025-08-29 LAB
A VAGINAE DNA VAG QL NAA+PROBE: NORMAL SCORE
BACTERIA SPEC CULT: NORMAL
BVAB2 DNA VAG QL NAA+PROBE: NORMAL SCORE
C ALBICANS DNA VAG QL NAA+PROBE: NEGATIVE
C GLABRATA DNA VAG QL NAA+PROBE: NEGATIVE
MEGA1 DNA VAG QL NAA+PROBE: NORMAL SCORE
SERVICE CMNT-IMP: NORMAL
SPECIMEN SOURCE: NORMAL

## 2025-09-02 ENCOUNTER — ROUTINE PRENATAL (OUTPATIENT)
Dept: OBGYN CLINIC | Age: 25
End: 2025-09-02
Payer: COMMERCIAL

## 2025-09-02 VITALS
DIASTOLIC BLOOD PRESSURE: 62 MMHG | SYSTOLIC BLOOD PRESSURE: 104 MMHG | WEIGHT: 145.3 LBS | HEIGHT: 60 IN | BODY MASS INDEX: 28.53 KG/M2

## 2025-09-02 DIAGNOSIS — O21.9 NAUSEA/VOMITING IN PREGNANCY: ICD-10-CM

## 2025-09-02 DIAGNOSIS — Z34.83 ENCOUNTER FOR SUPERVISION OF OTHER NORMAL PREGNANCY IN THIRD TRIMESTER: Primary | ICD-10-CM

## 2025-09-02 DIAGNOSIS — Z3A.30 30 WEEKS GESTATION OF PREGNANCY: ICD-10-CM

## 2025-09-02 DIAGNOSIS — Z23 NEED FOR DTAP VACCINE: ICD-10-CM

## 2025-09-02 DIAGNOSIS — Z13.89 SCREENING FOR GENITOURINARY CONDITION: ICD-10-CM

## 2025-09-02 LAB
GLUCOSE URINE, POC: NEGATIVE
PROTEIN,URINE, POC: NEGATIVE

## 2025-09-02 PROCEDURE — 90715 TDAP VACCINE 7 YRS/> IM: CPT | Performed by: OBSTETRICS & GYNECOLOGY

## 2025-09-02 PROCEDURE — 90471 IMMUNIZATION ADMIN: CPT | Performed by: OBSTETRICS & GYNECOLOGY

## 2025-09-02 PROCEDURE — 0502F SUBSEQUENT PRENATAL CARE: CPT | Performed by: OBSTETRICS & GYNECOLOGY
